# Patient Record
Sex: FEMALE | Race: WHITE | Employment: OTHER | URBAN - METROPOLITAN AREA
[De-identification: names, ages, dates, MRNs, and addresses within clinical notes are randomized per-mention and may not be internally consistent; named-entity substitution may affect disease eponyms.]

---

## 2017-10-04 ENCOUNTER — HOSPITAL ENCOUNTER (OUTPATIENT)
Dept: MAMMOGRAPHY | Age: 82
Discharge: HOME | End: 2017-10-04
Attending: INTERNAL MEDICINE
Payer: MEDICARE

## 2017-10-04 DIAGNOSIS — Z12.31 VISIT FOR SCREENING MAMMOGRAM: ICD-10-CM

## 2017-10-04 PROCEDURE — 77063 BREAST TOMOSYNTHESIS BI: CPT

## 2019-01-26 ENCOUNTER — HOSPITAL ENCOUNTER (OUTPATIENT)
Dept: MAMMOGRAPHY | Age: 84
Discharge: HOME | End: 2019-01-26
Attending: INTERNAL MEDICINE
Payer: MEDICARE

## 2019-01-26 DIAGNOSIS — Z12.31 VISIT FOR SCREENING MAMMOGRAM: ICD-10-CM

## 2019-01-26 PROCEDURE — 77063 BREAST TOMOSYNTHESIS BI: CPT

## 2019-04-23 ENCOUNTER — HOSPITAL ENCOUNTER (OUTPATIENT)
Dept: GENERAL RADIOLOGY | Age: 84
Discharge: HOME | End: 2019-04-23
Attending: INTERNAL MEDICINE
Payer: MEDICARE

## 2019-04-23 DIAGNOSIS — T17.308A CHOKING: ICD-10-CM

## 2019-04-23 PROCEDURE — 74011000250 HC RX REV CODE- 250: Performed by: INTERNAL MEDICINE

## 2019-04-23 PROCEDURE — 74220 X-RAY XM ESOPHAGUS 1CNTRST: CPT

## 2019-04-23 PROCEDURE — 74011000255 HC RX REV CODE- 255: Performed by: INTERNAL MEDICINE

## 2019-04-23 RX ADMIN — ANTACID/ANTIFLATULENT 4 G: 380; 550; 10; 10 GRANULE, EFFERVESCENT ORAL at 10:32

## 2019-04-23 RX ADMIN — BARIUM SULFATE 135 ML: 980 POWDER, FOR SUSPENSION ORAL at 10:32

## 2019-04-23 RX ADMIN — BARIUM SULFATE 355 ML: 0.6 SUSPENSION ORAL at 10:32

## 2019-07-18 ENCOUNTER — HOSPITAL ENCOUNTER (OUTPATIENT)
Dept: PHYSICAL THERAPY | Age: 84
Discharge: HOME | End: 2019-07-18
Payer: MEDICARE

## 2019-07-18 PROCEDURE — 92610 EVALUATE SWALLOWING FUNCTION: CPT | Performed by: SPEECH-LANGUAGE PATHOLOGIST

## 2019-07-18 NOTE — THERAPY EVALUATION
Terresa Castleman  : 1933  [place]  Secondary: [place] [employer] [address] at [facility]  [address] [facility], [address] [place] [address],[address], [facility] [address].  [phone]   [phone]         OUTPATIENT SPEECH LANGUAGE PATHOLOGY: Initial Assessment  ICD-10: Treatment Diagnosis: Dysphagia, Pharyngesophageal R13.14  REFERRING PHYSICIAN: Vasile Pineda MD Orders: Evaluate and Treat  PAST MEDICAL HISTORY:   Ms. [place] Mejía is a 80 y.o. female who  has no past medical history on file. She also  has a past surgical history that includes hx breast biopsy. MEDICAL/REFERRING DIAGNOSIS: Dyskinesia of esophagus [K22.4]  DATE OF ONSET: about a year ago  PRIOR LEVEL OF FUNCTION: Independent   PRECAUTIONS/ALLERGIES: Patient has no known allergies. ASSESSMENT:  Patient is an 80year old female who was referred for Dysphagia evaluation due to difficulty swallowing. She states that about a year ago she was in [employer] and her  gave her a throat lozenge and it \"slipped\" down her throat and closed her airway. Thereafter, she's had two episodes of coughing on liquids and tea. On these episodes, she can't breath and his gasping for air. No history of GERD however sometimes she will take a TUMS because she does have burning in her throat, but she states that this is very infrequent. Denies weight loss or pulmonary issues. Based on the objective data described below, the patient presents with minimal clinical s/sx of Dysphagia. Oral motor exam was WNL's. She was given trials of thin liquids via cup, puree, mixed and solids. No overt clinical s/sx of aspiration observed with any trials. Double swallows observed with all trials. She would benefit from MBSS to further assess her swallow function based off her history of choking episodes. MBSS has been requested and is scheduled for 19.  Will await results and recommendations from MBSS if further treatment is indicated. Patient in agreement with this plan. Patient will benefit from skilled intervention to address the below impairments. ?????? ? ? This section established at most recent assessment??????????  PROBLEM LIST (Impairments causing functional limitations):  1. Dysphagia  GOALS: (Goals have been discussed and agreed upon with patient.)  SHORT-TERM FUNCTIONAL GOALS: Time Frame: 90 days  Complete MBSS with 100% participation. Complete laryngeal exercises if deemed appropriate by MBSS at Mod I 90% accuracy. DISCHARGE GOALS: Time Frame: 3 months  1. Client will maintain adequate hydration/nutrition with optimum safety and efficiency of  swallowing function on P.O. intake without overt signs and symptoms of aspiration for the  highest appropriate diet level  2. Client will utilize compensatory strategies with optimum safety and efficiency of  swallowing function on P.O. intake without overt signs and symptoms of aspiration for the  highest appropriate diet level. REHABILITATION POTENTIAL FOR STATED GOALS: GoodPLAN OF CARE:  Patient will benefit from skilled intervention to address the following impairments. RECOMMENDATIONS AND PLANNED INTERVENTIONS (Benefits and precautions of therapy have been discussed with the patient.):  · continue prescribed diet  · Liquids:  regular thin  MEDICATIONS:  · With liquid  COMPENSATORY STRATEGIES/MODIFICATIONS INCLUDING:  · None  OTHER RECOMMENDATIONS (including follow up treatment recommendations):   · Laryngeal exercises  · Patient education  · if deemed appropriate by MBSS  RECOMMENDED DIET MODIFICATIONS DISCUSSED WITH:  · Patient  TREATMENT PLAN EFFECTIVE DATES: 7/18/2019 TO 8/18/2019 (30 days). FREQUENCY/DURATION: Continue to follow patient 1 time a week for 30 days to address above goals. Regarding Cristhian Tran's therapy, I certify that the treatment plan above will be carried out by a therapist or under their direction.   Thank you for this referral,  Elizabeth Heaton, C[place]lle CCC-SLP                  Referring Physician Signature: Priscilla Jean, *    Date      SUBJECTIVE:  Alert  Present Symptoms: Dysphagia   Pain Intensity 1: 0  Current Medications:   No current outpatient medications on file prior to encounter. No current facility-administered medications on file prior to encounter. Date Last Reviewed: 7/15/19 Rosuvastatin 20mg, Amdolodipine 5mg, Levothyroxine 75 mg, Biotin 5000 twice a day, Centrumslevec, Prisser Vision   Current Dietary Status:  Regular       History of reflux:  NO    Reflux medication:N/A  Social History/Home Situation:       Work/Activity History: Retired     OBJECTIVE:  Objective Measure: Tool Used: National Outcomes Measurement System: Functional Communication Measures: SWALLOWING  Score:  Initial: 5 Most Recent: X (Date: -- )   Interpretation of Tool: This measure describes the change in functional communication status subsequent to speech-language pathology treatment of patients with dysphagia.  o Level 1:  Individual is not able to swallow anything safely by mouth. All nutrition and hydration is received through non-oral means (e.g., nasogastric tube, PEG). o Level 2: Individual is not able to swallow safely by mouth for nutrition and hydration, but may take some consistency with consistent maximal cues in therapy only. Alternative method of feeding required. o Level 3:  Alternative method of feeding required as individual takes less than 50% of nutrition and hydration by mouth, and/or swallowing is safe with consistent use of moderate cues to use compensatory strategies and/or requires maximum diet restriction. o Level 4:  Swallowing is safe, but usually requires moderate cues to use compensatory strategies, and/or the individual has moderate diet restrictions and/or still requires tube feeding and/or oral supplements.   o Level 5:  Swallowing is safe with minimal diet restriction and/or occasionally requires minimal cueing to use compensatory strategies. The individual may occasionally self-cue. All nutrition and hydration needs are met by mouth at mealtime. o Level 6:  Swallowing is safe, and the individual eats and drinks independently and may rarely require minimal cueing. The individual usually self-cues when difficulty occurs. May need to avoid specific food items (e.g., popcorn and nuts), or require additional time (due to dysphagia). o Level 7: The individuals ability to eat independently is not limited by swallow function. Swallowing would be safe and efficient for all consistencies. Compensatory strategies are effectively used when needed. Score Level 7 Level 6 Level 5 Level 4 Level 3 Level 2 Level 1   Modifier CH CI CJ CK CL CM CN       Respiratory Status:      Room Air  CXR Results:N/A  MRI/CT Results:N/A  Oral Motor Structure/Speech:  Oral-Motor Structure/Motor Speech  Labial: No impairment  Dentition: Natural  Oral Hygiene: adequate  Lingual: No impairment  Velum: No impairment  Mandible: No impairment    Cognitive and Communication Status:  Neurologic State: Alert  Orientation Level: Oriented X4  Cognition: Appropriate for age attention/concentration; Follows commands  Perception: Appears intact  Perseveration: No perseveration noted  Safety/Judgement: Awareness of environment    BEDSIDE SWALLOW EVALUATION  Oral Assessment:  Oral Assessment  Labial: No impairment  Dentition: Natural  Oral Hygiene: adequate  Lingual: No impairment  Velum: No impairment  Mandible: No impairment  Gag Reflex: Present  P.O. Trials:  Patient Position: upright in chair    The patient was given teaspoon to tablespoon  amounts of the following:   Consistency Presented: Puree; Solid; Thin liquid;Mixed consistency  How Presented: Self-fed/presented;SLP-fed/presented;Straw;Successive swallows    ORAL PHASE:  Bolus Acceptance: No impairment  Bolus Formation/Control: No impairment  Propulsion: No impairment     Oral Residue: None    PHARYNGEAL PHASE:  Initiation of Swallow: No impairment  Laryngeal Elevation: Functional  Aspiration Signs/Symptoms: None  Vocal Quality: No impairment     Effective Modifications: None     Pharyngeal Phase Characteristics: Double swallow    OTHER OBSERVATIONS:  Rate/bite size: WNL   Endurance: WNL   Coments:      TREATMENT:    (In addition to Assessment/Re-Assessment sessions the following treatments were rendered)  Assessment only; No treatment(s) provided today        LARYNGEAL / PHARYNGEAL EXERCISES:                                                                                                                                     __________________________________________________________________________________________________  Treatment Assessment:  Evaluation completed. Progression/Medical Necessity:   · to rule out aspiration with liquids  Compliance with Program/Exercises: Will assess as treatment progresses. Reason for Continuation of Services/Other Comments:  · Patient continues to require skilled intervention due to c/o swallowing difficulty. Recommendations/Intent for next treatment session: \"Treatment next visit will focus on MBSS\". Total Treatment Duration:  Time In: 1100  Time Out: 199 [facility], Jabari Mcdowell

## 2019-08-06 ENCOUNTER — HOSPITAL ENCOUNTER (OUTPATIENT)
Dept: GENERAL RADIOLOGY | Age: 84
Discharge: HOME | End: 2019-08-06
Payer: MEDICARE

## 2019-08-06 DIAGNOSIS — R13.14 DYSPHAGIA, PHARYNGOESOPHAGEAL PHASE: ICD-10-CM

## 2019-08-06 DIAGNOSIS — R13.10 DYSPHAGIA: ICD-10-CM

## 2019-08-06 PROCEDURE — 74011000255 HC RX REV CODE- 255: Performed by: INTERNAL MEDICINE

## 2019-08-06 PROCEDURE — 92611 MOTION FLUOROSCOPY/SWALLOW: CPT

## 2019-08-06 PROCEDURE — 74230 X-RAY XM SWLNG FUNCJ C+: CPT

## 2019-08-06 RX ADMIN — BARIUM SULFATE 15 ML: 400 PASTE ORAL at 11:00

## 2019-08-06 RX ADMIN — BARIUM SULFATE 30 ML: 980 POWDER, FOR SUSPENSION ORAL at 10:59

## 2019-08-06 NOTE — PROGRESS NOTES
Carletha Osler  : 1933  [place]  Secondary: [place] [employer] [address] [facility] 68, [address] [facility] [place], [facility] [address] [facility]  [phone]   [phone]       OUTPATIENT SPEECH LANGUAGE PATHOLOGY: MODIFIED BARIUM SWALLOW    ICD-10: Treatment Diagnosis: Pharyngoesophageal dysphagia (R13.14)  DATE: 2019  REFERRING PHYSICIAN: Dajuan Sadler, *  MD Orders: Modifed Barium Swallow  PAST MEDICAL HISTORY:   Ms. Daryle Burner is a 80 y.o. female who  has no past medical history on file. She also  has a past surgical history that includes hx breast biopsy. RADIOLOGIST:  Dr. Ulises Schwarz  MEDICAL/REFERRING DIAGNOSIS: Dysphagia, pharyngoesophageal phase [R13.14]    PRECAUTIONS/ALLERGIES: Patient has no allergy information on record. ASSESSMENT/PLAN OF CARE:  Based on the objective data described below, the patient presents with oral and pharyngeal phase of swallow within functional limits. Swallows of all textures timely with no laryngeal penetration or aspiration observed and no pharyngeal residue after swallow. Can not rule out esophageal component or possible esophageal spasm. RECOMMENDATIONS AND PLANNED INTERVENTIONS (Benefits and precautions of therapy have been discussed with the patient.):  · continue prescribed diet  MEDICATIONS:  · With liquids as tolerated; use applesauce as needed for larger pills  COMPENSATORY STRATEGIES/MODIFICATIONS INCLUDING:  · Upright for all PO  · Small bites and sips  · Remain upright for 20-30 min after any PO  · Slow rate of PO intake    Thank you for this referral,  Jomar Cotton MA, CCC-SLP  SUBJECTIVE:    Present Symptoms: Patient reports choking on throat lozenge at [employer] requiring Heimlich maneuver. Since that episode, she has had 2 instances of choking on liquids, but no difficulties within the last 3 months.       Current Dietary Status:  Regular textures, thin liquids  History of reflux:  Patient reports occasional reflux, but no medication    OBJECTIVE:  Objective Measure: Tool Used: National Outcomes Measurement System: Functional Communication Measures: SWALLOWING  Score:  Initial: 7 Most Recent: X (Date: -- )   Interpretation of Tool: This measure describes the change in functional communication status subsequent to speech-language pathology treatment of patients with dysphagia.  o Level 1:  Individual is not able to swallow anything safely by mouth. All nutrition and hydration is received through non-oral means (e.g., nasogastric tube, PEG). o Level 2: Individual is not able to swallow safely by mouth for nutrition and hydration, but may take some consistency with consistent maximal cues in therapy only. Alternative method of feeding required. o Level 3:  Alternative method of feeding required as individual takes less than 50% of nutrition and hydration by mouth, and/or swallowing is safe with consistent use of moderate cues to use compensatory strategies and/or requires maximum diet restriction. o Level 4:  Swallowing is safe, but usually requires moderate cues to use compensatory strategies, and/or the individual has moderate diet restrictions and/or still requires tube feeding and/or oral supplements. o Level 5:  Swallowing is safe with minimal diet restriction and/or occasionally requires minimal cueing to use compensatory strategies. The individual may occasionally self-cue. All nutrition and hydration needs are met by mouth at mealtime. o Level 6:  Swallowing is safe, and the individual eats and drinks independently and may rarely require minimal cueing. The individual usually self-cues when difficulty occurs. May need to avoid specific food items (e.g., popcorn and nuts), or require additional time (due to dysphagia). o Level 7: The individuals ability to eat independently is not limited by swallow function. Swallowing would be safe and efficient for all consistencies. Compensatory strategies are effectively used when needed. Cognitive/Communication Status:  Mental Status  Neurologic State: Alert  Orientation Level: Appropriate for age  Cognition: Follows commands  Perception: Appears intact  Perseveration: No perseveration noted  Safety/Judgement: Insight into deficits    Oral Assessment:  Oral Assessment  Labial: No impairment  Dentition: Natural  Lingual: No impairment  Velum: No impairment    Vocal Quality: adequate    Patient Viewed:    Film Views: Lateral, Fluoro    Oral Prepatory:  The patient was given the following: Consistency Presented: Thin liquid, Solid, Pudding, Mixed consistency  How Presented: Self-fed/presented, Cup/sip, Cup/gulp, Spoon, Straw, Successive swallows    Oral Phase:  Bolus Acceptance: No impairment  Bolus Formation/Control: No impairment  Propulsion: No impairment     Oral Residue: None  Initiation of Swallow: No impairment  Oral Phase Severity: No impairment    Pharyngeal Phase:  Timing: No impairment  Decreased Tongue Base Retraction?: No  Laryngeal Elevation: WFL (within functional limits)  Penetration: None  Aspiration/Timing: No evidence of aspiration  Aspiration/Penetration Score: 1 (No penetration or aspiration-Contrast does not enter the airway)     Pharyngeal Dysfunction: None  Pharyngeal Phase Severity: N/A  Pharyngeal-Esophageal Segment: Suspected esophageal dysphagia    Assessment/Reassessment only, no treatment provided today    Recommendations for treatment: No further skilled speech/swallow intervention currently indicated.   Total Treatment Duration:  Time In: 1045   Time Out: 211 Izzy Rosen MA, CCC-SLP

## 2019-10-07 ENCOUNTER — HOSPITAL ENCOUNTER (OUTPATIENT)
Dept: PHYSICAL THERAPY | Age: 84
Discharge: HOME | End: 2019-10-07
Payer: MEDICARE

## 2019-10-07 PROCEDURE — 97161 PT EVAL LOW COMPLEX 20 MIN: CPT

## 2019-10-07 NOTE — THERAPY EVALUATION
Kathline Phoenix  : 1933  [place]  Secondary: [employer] [address] [facility] CECY MCNEILL  1101 [facility], [address] [place] [address],[address], [place] [address].  [phone]   [phone]       OUTPATIENT PHYSICAL [place] Assessment 10/7/2019     ICD-10: Treatment Diagnosis:Pain in right shoulder (M25.511), Other specific arthropathies, not elsewhere classified, right shoulder (M12.811)      Precautions/Allergies: allergic to Pcn and sulfa drugs  MD Orders: all active and passive ROM okay, resistance in all arcs okay, HEP, pulleys  3x/wk for 4 weeks MEDICAL/REFERRING DIAGNOSIS:  Other specific arthropathies, not elsewhere classified, right shoulder [M12.811]    DATE OF ONSET: a couple of months ago  REFERRING PHYSICIAN: Tayler Duffy MD  RETURN PHYSICIAN APPOINTMENT: about 4 weeks - after PT     INITIAL ASSESSMENT:  Ms. Tommie Carlson is an 80year old R hand dominant female with complaints of R shoulder pain. She presents with pain during active movement of shoulder, decreased ROM and strength in R shoulder and decreased functional use of R UE. She could benefit from PT to address deficits and work toward goals. PROBLEM LIST (Impacting functional limitations):  1. Decreased Strength  2. Decreased ADL/Functional Activities  3. Increased Pain  4. Decreased Flexibility/Joint Mobility INTERVENTIONS PLANNED: (Treatment may consist of any combination of the following)  1. Home Exercise Program (HEP)  2. Manual Therapy  3. Therapeutic Exercise/Strengthening  4. modals as needed   TREATMENT PLAN:  Effective Dates: 10/7/2019 TO 2020 (90 days). Frequency/Duration: 2-3 times a week for 90 Day(s) (4 weeks initially, and then depending on subsequent orders from MD). GOALS: (Goals have been discussed and agreed upon with patient.)  Patient's stated goals are to avoid shoulder surgery, and to regain as much use of R UE as possible.    Short-Term Functional Goals: Time Frame: 6 weeks  1. Patient to be independent with HEP  2. Patient to improve PROM R shoulder flex to 90 degrees for improved motion    3. Patient to rate pain with movement of R shoulder to <= 5/10  Discharge Goals: Time Frame: 90 days  1. Patient to report no more than minimal pain in R UE with all functional use  2. Patient to increase AROM R shoulder flex to 120 for improved functional use  3. Patient to improve DASH to 21 to demo improved use of R UE.     OUTCOME MEASURE:   Tool Used: Disabilities of the Arm, Shoulder and Hand (DASH) Questionnaire - Quick Version  Score:  Initial: 29/55  Most Recent: X/55 (Date: -- )   Interpretation of Score: The DASH is designed to measure the activities of daily living in person's with upper extremity dysfunction or pain. Each section is scored on a 1-5 scale, 5 representing the greatest disability. The scores of each section are added together for a total score of 55. MEDICAL NECESSITY:   · Patient demonstrates fair rehab potential due to higher previous functional level. REASON FOR SERVICES/OTHER COMMENTS:  · Patient continues to require skilled intervention due to need to regain functional use of R UE. Justin Rein Total Duration:  42 minutes  PT Patient Time In/Time Out  Time In: 1415  Time Out: 1457    Rehabilitation Potential For Stated Goals: 200 [facility]'s therapy, I certify that the treatment plan above will be carried out by a therapist or under their direction. Thank you for this referral,  Tutu Quinteros, PT     Referring Physician Signature: Jani Kidd MD _______________________________ Date _____________     PAIN/SUBJECTIVE:   Initial: Pain Intensity 1: 7(with movement, none at rest)   Post Session:  Pain not rated at end of session   HISTORY:   History of Injury/Illness (Reason for Referral):  Patient reports that R arm started hurting around August. She went to PCP and was diagnosed with bursitis.  Shoulder was injected and felt good for about 10 days. A second injection didn't help so she was sent for MRI which showed a rotator cuff tear, but she wants to avoid surgery if possible. Past Medical History/Comorbidities: HTN, high cholesterol, hysterectomy  Social History/Living Environment:     lives with  in 2 story home. Prior Level of Function/Work/Activity:  Retired teacher  Dominant Side:         RIGHT  Personal Factors:          Age:  80 y.o. Ambulatory/Rehab Services H2 Model Falls Risk Assessment   Risk Factors:       No Risk Factors Identified Ability to Rise from Chair:       (0)  Ability to rise in a single movement   Falls Prevention Plan:       No modifications necessary   Total: (5 or greater = High Risk): 0   ©2010 [facility] [email or web address]. All Rights Reserved. [facility] Patent #[id number]. Federal Law prohibits the replication, distribution or use without written permission from [facility] [employer]   Current Medications: Rosuvastatin, Amlodipine, Levothyroxine, Biotin, Centrum Silver, Presser Vision   Date Last Reviewed:  10/7/19   Number of Personal Factors/Comorbidities that affect the Plan of Care: 1-2: MODERATE COMPLEXITY   EXAMINATION:     Observation/Orthostatic Postural Assessment: patient with very little muscle mass in shoulders. No sling. Palpation: tender over anterior R shoulder and in area of R supraspinatous on posterior shoulder. ROM:       LUE AROM  L Shoulder Flexion: 155  L Shoulder Extension: 65  L Shoulder ABduction: 145  L Shoulder Internal Rotation: (T8 on back)  L Shoulder External Rotation: 75          RUE AROM  R Shoulder Flexion: 45  R Shoulder Extension: 65  R Shoulder ABduction: 60  R Shoulder Internal Rotation: (to sacrum)  R Shoulder External Rotation: 45  RUE PROM  R Shoulder Flexion: 73  R Shoulder ABduction: 73  R Shoulder Internal Rotation: 60(to abdomen)  R Shoulder External Rotation: 70                   Strength: R shoulder not tested secondary to pain.   L shoulder flex 4+/5, extn 5/5, abduct 4+/5, IR 4/5 and ER 4-/5  Neurological Screen: light touch intact in B UEs. Functional Mobility: independent, but with decreased use of R UE         Body Structures Involved:  1. Joints  2. Muscles Body Functions Affected:  1. Neuromusculoskeletal  2. Movement Related Activities and Participation Affected:  1. General Tasks and Demands  2. Self Care  3. Community, Social and [place]   Number of elements (examined above) that affect the Plan of Care: 1-2: LOW COMPLEXITY   CLINICAL PRESENTATION:   Presentation: Stable and uncomplicated: LOW COMPLEXITY   CLINICAL DECISION MAKING:   Use of outcome tool(s) and clinical judgement create a POC that gives a: Questionable prediction of patient's progress: MODERATE COMPLEXITY     Showed patient how to do wand exercises for R shoulder flex and abduct, and isometric IR and ER using other hand as resistance.

## 2019-10-08 ENCOUNTER — HOSPITAL ENCOUNTER (OUTPATIENT)
Dept: PHYSICAL THERAPY | Age: 84
Discharge: HOME | End: 2019-10-08
Payer: MEDICARE

## 2019-10-08 PROCEDURE — 97110 THERAPEUTIC EXERCISES: CPT

## 2019-10-08 PROCEDURE — 97140 MANUAL THERAPY 1/> REGIONS: CPT

## 2019-10-08 NOTE — PROGRESS NOTES
Marvin Ho  : 1933  [place] / Product Type: Medicare /  [address] [place]  at [facility] CECY MCNEILL  1101 [facility], [address] [place] [address],[address], [facility] [address].  [phone]   [phone]       OUTPATIENT PHYSICAL THERAPY: Daily Treatment Note 10/8/2019  Visit Count: 2  ICD-10: Treatment Diagnosis:Pain in right shoulder (M25.511), Other specific arthropathies, not elsewhere classified, right shoulder (M12.811)      Precautions/Allergies: allergic to Pcn and sulfa drugs  MD Orders: all active and passive ROM okay, resistance in all arcs okay, HEP, pulleys  3x/wk for 4 weeks MEDICAL/REFERRING DIAGNOSIS:  Other specific arthropathies, not elsewhere classified, right shoulder [M12.811]    DATE OF ONSET: a couple of months ago  REFERRING PHYSICIAN: Awilda Gorman MD  RETURN PHYSICIAN APPOINTMENT: about 4 weeks - after PT            Pre-treatment Symptoms/Complaints:  Patient reports her shoulder continues to be sore. Pain: Initial: Pain Intensity 1: 3(\"2 or 3\")   Post Session:  Pain not rated at end of session   Medications Last Reviewed:  10/7/19    Updated Objective Findings:   None Today     TREATMENT:     Manual Therapy ( 30 minutes) - for motion - grade 2 to 4- physio mobs R shoulder flex, abduct, IR and ER. Grade 2 to 4- inferior and posterior shoulder glides. Therapeutic Exercise: (10 Minutes):  Exercises per grid below to improve mobility and strength. Required moderate visual and verbal cues to ensure correct performance. Progressed complexity of movement as indicated. Date:  10/8/19 Date:   Date:     Activity/Exercise Parameters Parameters Parameters   5 way shoulder isometrics Manual 2x10 ea     Wand flex - reviewed     Pulleys for flex 1x10                                 HEP: continue current HEP with addition of pulleys. Pulleys issued for HEP use.    [employer] Portal    Treatment/Session Summary:    · Response to Treatment: patient very guarded during manual therapy and often squirmed with pain, especially with PT lowering her arm after elevation. .  · Communication/Consultation:  None today  · Equipment provided today:  None today  · Recommendations/Intent for next treatment session: Next visit will focus on ROM and strength. Treatment Plan of Care Effective Dates:  10/7/2019 TO 1/5/2020 (90 days). Frequency/Duration: 2-3 times a week for 90 Day(s) (4 weeks initially, and then depending on subsequent orders from MD).       Total Treatment Billable Duration:  40 minutes  PT Patient Time In/Time Out  Time In: 1118  Time Out: [address]

## 2019-10-14 ENCOUNTER — HOSPITAL ENCOUNTER (OUTPATIENT)
Dept: PHYSICAL THERAPY | Age: 84
Discharge: HOME | End: 2019-10-14
Payer: MEDICARE

## 2019-10-14 PROCEDURE — 97110 THERAPEUTIC EXERCISES: CPT

## 2019-10-14 PROCEDURE — 97140 MANUAL THERAPY 1/> REGIONS: CPT

## 2019-10-14 NOTE — PROGRESS NOTES
Cora Expose  : 1933  [place] / Product Type: Medicare /  [address] [place]  at [facility] CECY MCNEILL  [address] [place] [address], [address], [place] [address].  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 Thinks she is doing very well with the pulleys  Pain: Initial: Pain Intensity 1: 0   Post Session: \"No significant increase in pain. \"   Medications Last Reviewed:  10/14/19    Updated Objective Findings:   None Today     TREATMENT:     Manual Therapy ( 25 minutes) - for motion - grade 2 to 4- physio mobs R shoulder flex, abduct, IR and ER. Grade 2 to 4- inferior and posterior shoulder glides. Therapeutic Exercise: (15 Minutes):  Exercises per grid below to improve mobility and strength. Required moderate visual and verbal cues to ensure correct performance. Progressed complexity of movement as indicated. Date:  10/8/19 Date:  10/14/19 Date:     Activity/Exercise Parameters Parameters Parameters   5 way shoulder isometrics Manual 2x10 ea Manual 2x10 ea    Wand flex - reviewed -    Pulleys for flex 1x10 1x10    IR with band  Yellow 1x10    ER with band  Yellow 1x10                    HEP: continue current HEP with addition of IR and ER with band. Yellow band issued for HEP use. [employer]    Treatment/Session Summary:    · Response to Treatment: patient still very guarded during manual therapy. Very weak in IR and extremely weak in ER with band. .  · Communication/Consultation:  None today  · Equipment provided today:  None today  · Recommendations/Intent for next treatment session: Next visit will focus on ROM and strength. Treatment Plan of Care Effective Dates:  10/7/2019 TO 1/5/2020 (90 days). Frequency/Duration: 2-3 times a week for 90 Day(s) (4 weeks initially, and then depending on subsequent orders from MD).       Total Treatment Billable Duration:  40 minutes  PT Patient Time In/Time Out  Time In: 1121  Time Out: 71 Donnie Ortiz

## 2019-10-15 ENCOUNTER — HOSPITAL ENCOUNTER (OUTPATIENT)
Dept: PHYSICAL THERAPY | Age: 84
Discharge: HOME | End: 2019-10-15
Payer: MEDICARE

## 2019-10-15 NOTE — PROGRESS NOTES
Clementina Clarke  : 1933  [place] / Product Type: Medicare /  [address] [place]  at [facility] CECY MCNEILL  1101 [facility], [address], [address] [place] [address]  [phone]   [phone]       OUTPATIENT PHYSICAL THERAPY: No Show  10/15/2019     ICD-10: Treatment Diagnosis:Pain in right shoulder (M25.511), Other specific arthropathies, not elsewhere classified, right shoulder (M12.811)      Precautions/Allergies: allergic to Pcn and sulfa drugs  MD Orders: all active and passive ROM okay, resistance in all arcs okay, HEP, pulleys  3x/wk for 4 weeks MEDICAL/REFERRING DIAGNOSIS:  Other specific arthropathies, not elsewhere classified, right shoulder [M12.811]    DATE OF ONSET: a couple of months ago  REFERRING PHYSICIAN: Ty Gonzalez MD  RETURN PHYSICIAN APPOINTMENT: about 4 weeks - after PT            Patient did not show up initially for appointment. She came 30 minutes late because she thought her appointment was at 11:15 when it was really scheduled for 10:30. She could not be accommodated in the schedule due to someone else being scheduled at 11:15.

## 2019-10-17 ENCOUNTER — HOSPITAL ENCOUNTER (OUTPATIENT)
Dept: PHYSICAL THERAPY | Age: 84
Discharge: HOME | End: 2019-10-17
Payer: MEDICARE

## 2019-10-17 PROCEDURE — 97140 MANUAL THERAPY 1/> REGIONS: CPT

## 2019-10-17 PROCEDURE — 97110 THERAPEUTIC EXERCISES: CPT

## 2019-10-17 NOTE — PROGRESS NOTES
Windy Medeiros  : 1933  [place] / Product Type: Medicare /  [address] [place]  at [facility] CECY MCNEILL  1101 [facility], [address] [place] [address],[address], [address] [facility] [place]  [phone]   [phone]       OUTPATIENT PHYSICAL THERAPY: Daily Treatment Note 10/17/2019  Visit Count: 4  ICD-10: Treatment Diagnosis:Pain in right shoulder (M25.511), Other specific arthropathies, not elsewhere classified, right shoulder (M12.811)      Precautions/Allergies: allergic to Pcn and sulfa drugs  MD Orders: all active and passive ROM okay, resistance in all arcs okay, HEP, pulleys  3x/wk for 4 weeks MEDICAL/REFERRING DIAGNOSIS:  Other specific arthropathies, not elsewhere classified, right shoulder [M12.811]    DATE OF ONSET: a couple of months ago  REFERRING PHYSICIAN: Curtistine Kehr, MD  RETURN PHYSICIAN APPOINTMENT: about 4 weeks - after PT            Pre-treatment Symptoms/Complaints:  Patient reports her shoulder feels a little sore today. Didn't do her exercises much since last PT session. .   Pain: Initial: Pain Intensity 1: 3   Post Session:  Pain not rated at end of session   Medications Last Reviewed:  10/14/19    Updated Objective Findings:   None Today     TREATMENT:     Manual Therapy ( 25 minutes) - for motion - grade 2 to 4- physio mobs R shoulder flex, abduct, IR and ER. Grade 2 to 4- inferior and posterior shoulder glides. Therapeutic Exercise: (15 Minutes):  Exercises per grid below to improve mobility and strength. Required moderate visual and verbal cues to ensure correct performance. Progressed complexity of movement as indicated.      Date:  10/8/19 Date:  10/14/19 Date:  10/17/19   Activity/Exercise Parameters Parameters Parameters   5 way shoulder isometrics Manual 2x10 ea Manual 2x10 ea Manual 1x10 ea   Wand flex - reviewed - -   Pulleys for flex 1x10 1x10 -   IR with band  Yellow 1x10 Yellow 2x10   ER with band  Yellow 1x10 Yellow 2x10   B serratus punch 2x10   Flex in supine   1x10   Side lying abduct   2x10   Side lying ER   2x10        HEP: continue current HEP    [employer] Portal    Treatment/Session Summary:    · Response to Treatment: patient still very guarded during manual therapy and she is very weak in R shoulder, especially ER. .  · Communication/Consultation:  None today  · Equipment provided today:  None today  · Recommendations/Intent for next treatment session: Next visit will focus on ROM and strength. Treatment Plan of Care Effective Dates:  10/7/2019 TO 1/5/2020 (90 days). Frequency/Duration: 2-3 times a week for 90 Day(s) (4 weeks initially, and then depending on subsequent orders from MD).       Total Treatment Billable Duration:  40 minutes  PT Patient Time In/Time Out  Time In: 5231  Time Out: [address] Sheri Bee, PT

## 2019-10-21 ENCOUNTER — HOSPITAL ENCOUNTER (OUTPATIENT)
Dept: PHYSICAL THERAPY | Age: 84
Discharge: HOME | End: 2019-10-21
Payer: MEDICARE

## 2019-10-21 PROCEDURE — 97140 MANUAL THERAPY 1/> REGIONS: CPT

## 2019-10-21 PROCEDURE — 97110 THERAPEUTIC EXERCISES: CPT

## 2019-10-21 NOTE — PROGRESS NOTES
Jordan Garcia  : 1933  [place] / Product Type: Medicare /  [address] [place]  at [facility] CECY MCNEILL  1101 [facility], [address] [place] [address],[address], [address] [facility]  [phone]   [phone]       OUTPATIENT PHYSICAL THERAPY: Daily Treatment Note 10/21/2019  Visit Count: 5  ICD-10: Treatment Diagnosis:Pain in right shoulder (M25.511), Other specific arthropathies, not elsewhere classified, right shoulder (M12.811)      Precautions/Allergies: allergic to Pcn and sulfa drugs  MD Orders: all active and passive ROM okay, resistance in all arcs okay, HEP, pulleys  3x/wk for 4 weeks MEDICAL/REFERRING DIAGNOSIS:  Other specific arthropathies, not elsewhere classified, right shoulder [M12.811]    DATE OF ONSET: a couple of months ago  REFERRING PHYSICIAN: Og Magaña MD  RETURN PHYSICIAN APPOINTMENT: about 4 weeks - after PT            Pre-treatment Symptoms/Complaints:  Patient reports her shoulder is doing okay. Was sore Saturday at the [facility] because the tables were too high. Pain: Initial: Pain Intensity 1: 1   Post Session:  No change in pain noted at end of session   Medications Last Reviewed:  10/21/19    Updated Objective Findings:   None Today     TREATMENT:     Manual Therapy ( 15 minutes) - for motion - grade 2 to 4- physio mobs R shoulder flex, abduct, IR and ER. Grade 2 to 4- inferior and posterior shoulder glides. Therapeutic Exercise: (25 Minutes):  Exercises per grid below to improve mobility and strength. Required moderate visual and verbal cues to ensure correct performance. Progressed resistance and complexity of movement as indicated.      Date:  10/8/19 Date:  10/14/19 Date:  10/17/19 Date  10/21/19   Activity/Exercise Parameters Parameters Parameters    5 way shoulder isometrics Manual 2x10 ea Manual 2x10 ea Manual 1x10 ea Manual 1x10 ea for flex, abd, extn  2x10 ea for IR, ER   Wand flex - reviewed - - -   Pulleys for flex 1x10 1x10 - -   IR with band  Yellow 1x10 Yellow 2x10 Yellow 2x10   ER with band  Yellow 1x10 Yellow 2x10 Yellow 2x10   B serratus punch   2x10 1# 2x10   Flex in supine   1x10 2x10    Side lying abduct   2x10 2x10   Side lying ER   2x10  2x10   scap retract with band    Yellow 2x10       HEP: continue current HEP    [employer] Portal    Treatment/Session Summary:    · Response to Treatment: patient still very guarded during manual therapy. Worked more on strengthening today as she remains very weak. .  · Communication/Consultation:  None today  · Equipment provided today:  None today  · Recommendations/Intent for next treatment session: Next visit will focus on ROM and strength. Treatment Plan of Care Effective Dates:  10/7/2019 TO 1/5/2020 (90 days). Frequency/Duration: 2-3 times a week for 90 Day(s) (4 weeks initially, and then depending on subsequent orders from MD).       Total Treatment Billable Duration:  40 minutes  PT Patient Time In/Time Out  Time In: 5723  Time Out: [address] Leellen Boas

## 2019-10-22 ENCOUNTER — HOSPITAL ENCOUNTER (OUTPATIENT)
Dept: PHYSICAL THERAPY | Age: 84
Discharge: HOME | End: 2019-10-22
Payer: MEDICARE

## 2019-10-22 PROCEDURE — 97110 THERAPEUTIC EXERCISES: CPT

## 2019-10-22 PROCEDURE — 97140 MANUAL THERAPY 1/> REGIONS: CPT

## 2019-10-22 NOTE — PROGRESS NOTES
Tana Becker  : 1933  [place] / Product Type: Medicare /  [address] [place]  at [facility] CECY MCNEILL  1101 [facility], [address] [place] [address],[address], [facility] [address].  [phone]   [phone]       OUTPATIENT PHYSICAL THERAPY: Daily Treatment Note 10/22/2019  Visit Count: 6  ICD-10: Treatment Diagnosis:Pain in right shoulder (M25.511), Other specific arthropathies, not elsewhere classified, right shoulder (M12.811)      Precautions/Allergies: allergic to Pcn and sulfa drugs  MD Orders: all active and passive ROM okay, resistance in all arcs okay, HEP, pulleys  3x/wk for 4 weeks MEDICAL/REFERRING DIAGNOSIS:  Other specific arthropathies, not elsewhere classified, right shoulder [M12.811]    DATE OF ONSET: a couple of months ago  REFERRING PHYSICIAN: Betsy Mooney MD  RETURN PHYSICIAN APPOINTMENT: about 4 weeks - after PT            Pre-treatment Symptoms/Complaints:  Patient reports she isn't in any pain right now. Took a Valium before coming today so she could relax better. Pain: Initial: Pain Intensity 1: 0   Post Session:  No change in pain. Medications Last Reviewed:  10/21/19    Updated Objective Findings:   None Today     TREATMENT:     Manual Therapy ( 15 minutes) - for motion - grade 2 to 4- physio mobs R shoulder flex, abduct, IR and ER. Grade 2 to 4- inferior and posterior shoulder glides. Therapeutic Exercise: (25 Minutes):  Exercises per grid below to improve mobility and strength. Required moderate visual and verbal cues to ensure correct performance. Progressed complexity of movement as indicated.      Date:  10/8/19 Date:  10/14/19 Date:  10/17/19 Date  10/21/19 Date  10/22/19   Activity/Exercise Parameters Parameters Parameters     5 way shoulder isometrics Manual 2x10 ea Manual 2x10 ea Manual 1x10 ea Manual 1x10 ea for flex, abd, extn  2x10 ea for IR, ER Manual 2x10 ea   Wand flex - reviewed - - - -   Pulleys for flex 1x10 1x10 - - - IR with band  Yellow 1x10 Yellow 2x10 Yellow 2x10 Yellow 2x10   ER with band  Yellow 1x10 Yellow 2x10 Yellow 2x10 Yellow 2x10   B serratus punch   2x10 1# 2x10 1# 2x10   Flex in supine   1x10 2x10  2x10   Side lying abduct   2x10 2x10 2x10   Side lying ER   2x10  2x10 2x10   scap retract with band    Yellow 2x10 Yellow 2x10   B Serratus punch with band     Yellow 2x10       HEP: continue current HEP    [employer] Portal    Treatment/Session Summary:    · Response to Treatment: patient did a little better relaxing during manual therapy. Worked more on strengthening again today. She has one more visit before returning to MD.   .  · Communication/Consultation:  None today  · Equipment provided today:  None today  · Recommendations/Intent for next treatment session: Next visit will focus on ROM and strength. Treatment Plan of Care Effective Dates:  10/7/2019 TO 1/5/2020 (90 days). Frequency/Duration: 2-3 times a week for 90 Day(s) (4 weeks initially, and then depending on subsequent orders from MD).       Total Treatment Billable Duration:  40 minutes  PT Patient Time In/Time Out  Time In: 1116  Time Out: Michelle De [address] Danieleda Cabot

## 2019-10-24 ENCOUNTER — HOSPITAL ENCOUNTER (OUTPATIENT)
Dept: PHYSICAL THERAPY | Age: 84
Discharge: HOME | End: 2019-10-24
Payer: MEDICARE

## 2019-10-24 PROCEDURE — 97110 THERAPEUTIC EXERCISES: CPT

## 2019-10-24 PROCEDURE — 97140 MANUAL THERAPY 1/> REGIONS: CPT

## 2019-10-24 NOTE — PROGRESS NOTES
Marvin Ho  : 1933  [place] / Product Type: Medicare /  [address] [place]  at [facility] CECY MCNEILL  1101 [facility], [address] [place] [address],[address], [facility] [address].  [phone]   [phone]       OUTPATIENT PHYSICAL THERAPY: Daily Treatment Note 10/24/2019  Visit Count: 7  ICD-10: Treatment Diagnosis:Pain in right shoulder (M25.511), Other specific arthropathies, not elsewhere classified, right shoulder (M12.811)      Precautions/Allergies: allergic to Pcn and sulfa drugs  MD Orders: all active and passive ROM okay, resistance in all arcs okay, HEP, pulleys  3x/wk for 4 weeks MEDICAL/REFERRING DIAGNOSIS:  Other specific arthropathies, not elsewhere classified, right shoulder [M12.811]    DATE OF ONSET: a couple of months ago  REFERRING PHYSICIAN: Awilda Gorman MD  RETURN PHYSICIAN APPOINTMENT: 19            Pre-treatment Symptoms/Complaints:  Patient reports she isn't in any pain right now. Feels like she is getting a little better ROM. She does not return to MD next week after all, it is the week after that. Pain: Initial: Pain Intensity 1: 0   Post Session:  No change in pain reported by patient. Medications Last Reviewed:  10/21/19    Updated Objective Findings:   None Today     TREATMENT:     Manual Therapy ( 15 minutes) - for motion - grade 2 to 4- physio mobs R shoulder flex, abduct, IR and ER. Grade 2 to 4- inferior and posterior shoulder glides. Therapeutic Exercise: (25 Minutes):  Exercises per grid below to improve mobility and strength. Required moderate visual and verbal cues to ensure correct performance. Progressed complexity of movement as indicated.      Date:  10/8/19 Date:  10/14/19 Date:  10/17/19 Date  10/21/19 Date  10/22/19 Date  10/24/19   Activity/Exercise Parameters Parameters Parameters      5 way shoulder isometrics Manual 2x10 ea Manual 2x10 ea Manual 1x10 ea Manual 1x10 ea for flex, abd, extn  2x10 ea for IR, ER Manual 2x10 ea -   Wand flex - reviewed - - - - -   Pulleys for flex 1x10 1x10 - - - -   IR with band  Yellow 1x10 Yellow 2x10 Yellow 2x10 Yellow 2x10 Yellow 2x10   ER with band  Yellow 1x10 Yellow 2x10 Yellow 2x10 Yellow 2x10 Yellow 2x10   B serratus punch   2x10 1# 2x10 1# 2x10 1# 2x15   Flex in supine   1x10 2x10  2x10 4x5   Side lying abduct   2x10 2x10 2x10 4x5   Side lying ER   2x10  2x10 2x10 2x10   scap retract with band    Yellow 2x10 Yellow 2x10 Red 2x10   B Serratus punch with band     Yellow 2x10 Yellow 2x10   B biceps curls      1# 2x10   Wall push ups      4x5   Wall slides      1x5       HEP: continue current HEP    [employer] Portal    Treatment/Session Summary:    · Response to Treatment: patient seemed to have more pain with exercises today and needed more frequent rest breaks. She is very weak. .  · Communication/Consultation:  None today  · Equipment provided today:  None today  · Recommendations/Intent for next treatment session: Next visit will focus on ROM and strength. Treatment Plan of Care Effective Dates:  10/7/2019 TO 1/5/2020 (90 days). Frequency/Duration: 2-3 times a week for 90 Day(s) (4 weeks initially, and then depending on subsequent orders from MD).       Total Treatment Billable Duration:  40 minutes  PT Patient Time In/Time Out  Time In: 4749  Time Out: ANJU Arizmendi  Franco Holm

## 2019-10-28 ENCOUNTER — HOSPITAL ENCOUNTER (OUTPATIENT)
Dept: PHYSICAL THERAPY | Age: 84
Discharge: HOME | End: 2019-10-28
Payer: MEDICARE

## 2019-10-28 PROCEDURE — 97140 MANUAL THERAPY 1/> REGIONS: CPT

## 2019-10-28 PROCEDURE — 97110 THERAPEUTIC EXERCISES: CPT

## 2019-10-28 NOTE — PROGRESS NOTES
Marvin Ho  : 1933  [place] / Product Type: Medicare /  [address] [place]  at [facility] CECY MCNEILL  1101 [facility], [address] [place] [address],[address], [facility] [address].  [phone]   [phone]       OUTPATIENT PHYSICAL THERAPY: Daily Treatment Note 10/28/2019  Visit Count: 8  ICD-10: Treatment Diagnosis:Pain in right shoulder (M25.511), Other specific arthropathies, not elsewhere classified, right shoulder (M12.811)      Precautions/Allergies: allergic to Pcn and sulfa drugs  MD Orders: all active and passive ROM okay, resistance in all arcs okay, HEP, pulleys  3x/wk for 4 weeks MEDICAL/REFERRING DIAGNOSIS:  Other specific arthropathies, not elsewhere classified, right shoulder [M12.811]    DATE OF ONSET: a couple of months ago  REFERRING PHYSICIAN: Awilda Gorman MD  RETURN PHYSICIAN APPOINTMENT: 19            Pre-treatment Symptoms/Complaints:  Patient reports she isn't in any pain right now. But it hurt quite a bit when she first woke up this morning. Pain: Initial: Pain Intensity 1: 0   Post Session:  No change in pain reported by patient. Medications Last Reviewed:  10/28/19    Updated Objective Findings:   None Today     TREATMENT:     Manual Therapy ( 15 minutes) - for motion - grade 2 to 4- physio mobs R shoulder flex, abduct, IR and ER. Grade 2 to 4- inferior and posterior shoulder glides. Therapeutic Exercise: (25 Minutes):  Exercises per grid below to improve mobility and strength. Required moderate visual and verbal cues to ensure correct performance. Reduced resistance today as patient struggled with 1# weight on serratus punch exercise. .      Date:  10/17/19 Date  10/21/19 Date  10/22/19 Date  10/24/19 Date  10/28/19   Activity/Exercise Parameters       5 way shoulder isometrics Manual 1x10 ea Manual 1x10 ea for flex, abd, extn  2x10 ea for IR, ER Manual 2x10 ea - Manual 1x10 ea   IR with band Yellow 2x10 Yellow 2x10 Yellow 2x10 Yellow 2x10 Yellow 2x10   ER with band Yellow 2x10 Yellow 2x10 Yellow 2x10 Yellow 2x10 Yellow 2x10   B serratus punch 2x10 1# 2x10 1# 2x10 1# 2x15 1# 1x10  0# 1x15   Flex in supine 1x10 2x10  2x10 4x5 4x5   Side lying abduct 2x10 2x10 2x10 4x5 4x5   Side lying ER 2x10  2x10 2x10 2x10 2x10   scap retract with band  Yellow 2x10 Yellow 2x10 Red 2x10 Red 2x10   B Serratus punch with band   Yellow 2x10 Yellow 2x10 Yellow 2x10   B biceps curls    1# 2x10 1# 2x10   Wall push ups    4x5 4x5   Wall slides    1x5 -       HEP: continue current HEP    [employer] Portal    Treatment/Session Summary:    · Response to Treatment: patient remains very weak. She does exercises as asked, but she continues to struggle with very light resistance. She has one more PT session tomorrow and then will be out of town until she returns to MD on 11/7/19. .  · Communication/Consultation:  None today  · Equipment provided today:  None today  · Recommendations/Intent for next treatment session: Next visit will focus on ROM and strength. Treatment Plan of Care Effective Dates:  10/7/2019 TO 1/5/2020 (90 days). Frequency/Duration: 2-3 times a week for 90 Day(s) (4 weeks initially, and then depending on subsequent orders from MD).       Total Treatment Billable Duration:  40 minutes  PT Patient Time In/Time Out  Time In: 1120  Time Out: [address] Laveda Cabot

## 2019-10-29 ENCOUNTER — HOSPITAL ENCOUNTER (OUTPATIENT)
Dept: PHYSICAL THERAPY | Age: 84
Discharge: HOME | End: 2019-10-29
Payer: MEDICARE

## 2019-10-29 PROCEDURE — 97110 THERAPEUTIC EXERCISES: CPT

## 2019-10-29 PROCEDURE — 97140 MANUAL THERAPY 1/> REGIONS: CPT

## 2019-10-29 NOTE — PROGRESS NOTES
Raz Hinds  : 1933  [place] / Product Type: Medicare /  [address]  at [facility] CECY MCNEILL  1101 [facility], [address] [place] [address],[address], [facility] [address].  [phone]   [phone]       OUTPATIENT PHYSICAL THERAPY: Daily Treatment Note 10/29/2019  Visit Count: 9  ICD-10: Treatment Diagnosis:Pain in right shoulder (M25.511), Other specific arthropathies, not elsewhere classified, right shoulder (M12.811)      Precautions/Allergies: allergic to Pcn and sulfa drugs  MD Orders: all active and passive ROM okay, resistance in all arcs okay, HEP, pulleys  3x/wk for 4 weeks MEDICAL/REFERRING DIAGNOSIS:  Other specific arthropathies, not elsewhere classified, right shoulder [M12.811]    DATE OF ONSET: a couple of months ago  REFERRING PHYSICIAN: Camron Marquez MD  RETURN PHYSICIAN APPOINTMENT: 19            Pre-treatment Symptoms/Complaints:  Patient reports no changes since yesterday. Pain: Initial: Pain Intensity 1: 0   Post Session:  No change in pain reported by patient. Medications Last Reviewed:  10/28/19    Updated Objective Findings:   See progress note     TREATMENT:     Manual Therapy ( 15 minutes) - for motion - grade 2 to 4- physio mobs R shoulder flex, abduct, IR and ER. Grade 2 to 4- inferior and posterior shoulder glides. Therapeutic Exercise: (25 Minutes):  Exercises per grid below to improve mobility and strength. Required moderate visual and verbal cues to ensure correct performance.        Date:  10/17/19 Date  10/21/19 Date  10/22/19 Date  10/24/19 Date  10/28/19 Date  10/29/19   Activity/Exercise Parameters        5 way shoulder isometrics Manual 1x10 ea Manual 1x10 ea for flex, abd, extn  2x10 ea for IR, ER Manual 2x10 ea - Manual 1x10 ea Manual 1x10 ea   IR with band Yellow 2x10 Yellow 2x10 Yellow 2x10 Yellow 2x10 Yellow 2x10 Yellow 2x10   ER with band Yellow 2x10 Yellow 2x10 Yellow 2x10 Yellow 2x10 Yellow 2x10 Yellow 2x10 B serratus punch 2x10 1# 2x10 1# 2x10 1# 2x15 1# 1x10  0# 1x15 1# 2x10   Flex in supine 1x10 2x10  2x10 4x5 4x5 1x10  2x5   Side lying abduct 2x10 2x10 2x10 4x5 4x5 -   Side lying ER 2x10  2x10 2x10 2x10 2x10 -   scap retract with band  Yellow 2x10 Yellow 2x10 Red 2x10 Red 2x10 Red 2x10   B Serratus punch with band   Yellow 2x10 Yellow 2x10 Yellow 2x10 Yellow 2x10   B biceps curls    1# 2x10 1# 2x10 1# 2x12   Wall push ups    4x5 4x5 -   Wall slides    1x5 - -       HEP: continue current HEP    [facility] Portal    Treatment/Session Summary:    · Response to Treatment: patient has progressed well with both RPOM and AROM of R shoulder, but does not yet have full motion. See progress note from today for full assessment. .  · Communication/Consultation:  progress note sent to ordering MD  · Equipment provided today:  None today  · Recommendations/Intent for next treatment session: Next visit will focus on ROM and strength. Treatment Plan of Care Effective Dates:  10/7/2019 TO 1/5/2020 (90 days). Frequency/Duration: 2-3 times a week for 90 Day(s) (4 weeks initially, and then depending on subsequent orders from MD).       Total Treatment Billable Duration:  40 minutes  PT Patient Time In/Time Out  Time In: 1105  Time Out: [address] [facility]

## 2019-10-29 NOTE — PROGRESS NOTES
Kaleigh Means  : 1933  [place]  Secondary: [place] [employer] [address] [facility] CECY MCNEILL  1101 [facility], [address] [place] [address],[address], [facility] [address].  [phone]   [phone]       OUTPATIENT PHYSICAL THERAPY:Progress Report 10/29/2019     ICD-10: Treatment Diagnosis:Pain in right shoulder (M25.511), Other specific arthropathies, not elsewhere classified, right shoulder (M12.811)      Precautions/Allergies: allergic to Pcn and sulfa drugs  MD Orders: all active and passive ROM okay, resistance in all arcs okay, HEP, pulleys  3x/wk for 4 weeks  Patient has attended 9 PT sessions from 10/7/19 to 10/29/19 with one missed session MEDICAL/REFERRING DIAGNOSIS:  Other specific arthropathies, not elsewhere classified, right shoulder [M12.811]    DATE OF ONSET: a couple of months ago  REFERRING PHYSICIAN: Dee Nieto MD  RETURN PHYSICIAN APPOINTMENT: about 4 weeks - after PT     ASSESSMENT:  Ms. Verona Robb is an 80year old R hand dominant female with complaints of R shoulder pain. She presented with pain during active movement of shoulder, decreased ROM and strength in R shoulder and decreased functional use of R UE. Both her AROM and PROM of R shoulder have improved. Her DASH score is slightly improved. She could benefit from continued PT to address deficits and work toward goals. PROBLEM LIST (Impacting functional limitations):  1. Decreased Strength  2. Decreased ADL/Functional Activities  3. Increased Pain  4. Decreased Flexibility/Joint Mobility INTERVENTIONS PLANNED: (Treatment may consist of any combination of the following)  1. Home Exercise Program (HEP)  2. Manual Therapy  3. Therapeutic Exercise/Strengthening  4. modals as needed   TREATMENT PLAN:  Effective Dates: 10/7/2019 TO 2020 (90 days). Frequency/Duration: 2-3 times a week for 90 Day(s) (4 weeks initially, and then depending on subsequent orders from MD).    GOALS: (Goals have been discussed and agreed upon with patient.)  Patient's stated goals are to avoid shoulder surgery, and to regain as much use of R UE as possible. Short-Term Functional Goals: Time Frame: 6 weeks  1. Patient to be independent with HEP - MET  2. Patient to improve PROM R shoulder flex to 90 degrees for improved motion  - MET  3. Patient to rate pain with movement of R shoulder to <= 5/10 - Mostly MET  Discharge Goals: Time Frame: 90 days - all in progress  1. Patient to report no more than minimal pain in R UE with all functional use  2. Patient to increase AROM R shoulder flex to 120 for improved functional use  3. Patient to improve DASH to 21 to demo improved use of R UE.     OUTCOME MEASURE:   Tool Used: Disabilities of the Arm, Shoulder and Hand (DASH) Questionnaire - Quick Version  Score:  Initial: 29/55  Most Recent: 25/55 (Date: 10/29/19 )   Interpretation of Score: The DASH is designed to measure the activities of daily living in person's with upper extremity dysfunction or pain. Each section is scored on a 1-5 scale, 5 representing the greatest disability. The scores of each section are added together for a total score of 55. MEDICAL NECESSITY:   · Patient demonstrates fair rehab potential due to higher previous functional level. REASON FOR SERVICES/OTHER COMMENTS:  · Patient continues to require skilled intervention due to need to regain functional use of R UE. [place] PAIN/SUBJECTIVE:   Initial: Pain Intensity 1: 0   Post Session:  No change   HISTORY:   History of Injury/Illness (Reason for Referral):  Patient reports that R arm started hurting around August. She went to PCP and was diagnosed with bursitis. Shoulder was injected and felt good for about 10 days. A second injection didn't help so she was sent for MRI which showed a rotator cuff tear, but she wants to avoid surgery if possible.    Past Medical History/Comorbidities: HTN, high cholesterol, hysterectomy  Social History/Living Environment:     lives with  in 2 story home. Prior Level of Function/Work/Activity:  Retired teacher  Dominant Side:         RIGHT  Personal Factors:          Age:  80 y.o. Current Medications: Rosuvastatin, Amlodipine, Levothyroxine, Biotin, Centrum Silver, Presser Vision   Date Last Reviewed:  10/28/19   EXAMINATION:     Observation/Orthostatic Postural Assessment: patient with very little muscle mass in shoulders. No sling. ROM:  On 10/29/19                RUE AROM  R Shoulder Flexion: 85  R Shoulder Extension: 65  R Shoulder ABduction: 82  R Shoulder Internal Rotation: (T12 on back)  R Shoulder External Rotation: 65  RUE PROM  R Shoulder Flexion: 140  R Shoulder ABduction: 145  R Shoulder Internal Rotation: 70(at 90 degree abduct)  R Shoulder External Rotation: 90(at 90 degree abduct)                   Strength: R shoulder not tested secondary to pain. L shoulder flex 4+/5, extn 5/5, abduct 4+/5, IR 4/5 and ER 4-/5  (at eval)  Neurological Screen: light touch intact in B UEs.  (at eval)  Functional Mobility: independent, but with decreased use of R UE

## 2019-11-11 ENCOUNTER — HOSPITAL ENCOUNTER (OUTPATIENT)
Dept: PHYSICAL THERAPY | Age: 84
Discharge: HOME | End: 2019-11-11
Payer: MEDICARE

## 2019-11-11 PROCEDURE — 97140 MANUAL THERAPY 1/> REGIONS: CPT

## 2019-11-11 PROCEDURE — 97110 THERAPEUTIC EXERCISES: CPT

## 2019-11-11 NOTE — PROGRESS NOTES
Shirley Naik  : 1933  [place] / Product Type: Medicare /  [address]  at [facility] CECY MCNEILL  1101 [facility], [address] [place] [address],[address], [place] [address].  [phone]   [phone]       OUTPATIENT PHYSICAL THERAPY: Daily Treatment Note 2019  Visit Count: 10  ICD-10: Treatment Diagnosis:Pain in right shoulder (M25.511), Other specific arthropathies, not elsewhere classified, right shoulder (M12.811)      Precautions/Allergies: allergic to Pcn and sulfa drugs  MD Orders: all active and passive ROM okay, resistance in all arcs okay, HEP, pulleys  3x/wk for 4 weeks MEDICAL/REFERRING DIAGNOSIS:  Other specific arthropathies, not elsewhere classified, right shoulder [M12.811]    DATE OF ONSET: a couple of months ago  REFERRING PHYSICIAN: Vj Borrego MD  RETURN PHYSICIAN APPOINTMENT: 19            Pre-treatment Symptoms/Complaints:  Patient reports she returned to MD he was pleased with progress. He ordered continued PT but she forgot to bring the new script but will next time. He wants her to continue to work on strength. Pain: Initial: Pain Intensity 1: 0   Post Session:  No change in pain reported by patient. Medications Last Reviewed:  19    Updated Objective Findings:   None Today     TREATMENT:     Manual Therapy ( 15 minutes) - for motion - grade 2 to 4- physio mobs R shoulder flex, abduct, IR and ER. Grade 2 to 4- inferior and posterior shoulder glides. Therapeutic Exercise: (25 Minutes):  Exercises per grid below to improve mobility and strength. Required moderate visual and verbal cues to ensure correct performance. Progressed resistance as indicated.       Date  10/22/19 Date  10/24/19 Date  10/28/19 Date  10/29/19 Date  19   Activity/Exercise        5 way shoulder isometrics Manual 2x10 ea - Manual 1x10 ea Manual 1x10 ea Manual 2x10 ea   IR with band Yellow 2x10 Yellow 2x10 Yellow 2x10 Yellow 2x10 Yellow 2x10 ER with band Yellow 2x10 Yellow 2x10 Yellow 2x10 Yellow 2x10 Yellow 2x10   B serratus punch 1# 2x10 1# 2x15 1# 1x10  0# 1x15 1# 2x10 1# 2x12   Flex in supine 2x10 4x5 4x5 1x10  2x5 1# 3x5   Side lying abduct 2x10 4x5 4x5 - 1# 2x15   Side lying ER 2x10 2x10 2x10 - 1# 2x15   scap retract with band Yellow 2x10 Red 2x10 Red 2x10 Red 2x10 Red 2x10   B Serratus punch with band Yellow 2x10 Yellow 2x10 Yellow 2x10 Yellow 2x10 Yellow 2x10   B biceps curls  1# 2x10 1# 2x10 1# 2x12 2# 2x10   Wall push ups  4x5 4x5 - -   Wall slides  1x5 - - -       HEP: continue current HEP    [employer] Portal    Treatment/Session Summary:    · Response to Treatment: patient returned to MD and he ordered continued PT. Patient is still weak in R shoulder but she did progress in resistance used today. · Communication/Consultation:  None today  · Equipment provided today:  None today  · Recommendations/Intent for next treatment session: Next visit will focus on ROM and strength. Treatment Plan of Care Effective Dates:  10/7/2019 TO 1/5/2020 (90 days).   Frequency/Duration: 2-3 times a week for 90 Day(s)      Total Treatment Billable Duration:  40 minutes  PT Patient Time In/Time Out  Time In: 1520  Time Out: 14[address] [facility] Sheri Bee PT

## 2019-11-12 ENCOUNTER — HOSPITAL ENCOUNTER (OUTPATIENT)
Dept: PHYSICAL THERAPY | Age: 84
Discharge: HOME | End: 2019-11-12
Payer: MEDICARE

## 2019-11-12 PROCEDURE — 97140 MANUAL THERAPY 1/> REGIONS: CPT

## 2019-11-12 PROCEDURE — 97110 THERAPEUTIC EXERCISES: CPT

## 2019-11-12 NOTE — PROGRESS NOTES
Jelly Goode  : 1933  [place] / Product Type: Medicare /  [address]  at [facility] CECY MCNEILL  1101 [facility], [address] [place] [address],[address], [facility] [address].  [phone]   [phone]       OUTPATIENT PHYSICAL THERAPY: Daily Treatment Note 2019  Visit Count: 11  ICD-10: Treatment Diagnosis:Pain in right shoulder (M25.511), Other specific arthropathies, not elsewhere classified, right shoulder (M12.811)      Precautions/Allergies: allergic to Pcn and sulfa drugs  MD Orders: continue current regimen   2-3x/wk for 4 weeks  (written 19) MEDICAL/REFERRING DIAGNOSIS:  Other specific arthropathies, not elsewhere classified, right shoulder [M12.811]    DATE OF ONSET: a couple of months ago  REFERRING PHYSICIAN: Zoila De Guzman MD  [address] [facility] [address]: 19            Pre-treatment Symptoms/Complaints:  Patient reports she reached back inadvertently to put her jacket on and that made her shoulder just a little sore. Pain: Initial: Pain Intensity 1: 1   Post Session:  No change in pain reported by patient. Medications Last Reviewed:  19    Updated Objective Findings:   None Today     TREATMENT:     Manual Therapy ( 25 minutes) - for motion - grade 2 to 4- physio mobs R shoulder flex, abduct, IR and ER. Grade 2 to 4- inferior and posterior shoulder glides. Therapeutic Exercise: (15 Minutes):  Exercises per grid below to improve mobility and strength. Required moderate visual and verbal cues to ensure correct performance. Progressed complexity as indicated.       Date  10/22/19 Date  10/24/19 Date  10/28/19 Date  10/29/19 Date  19 Date  19   Activity/Exercise         5 way shoulder isometrics Manual 2x10 ea - Manual 1x10 ea Manual 1x10 ea Manual 2x10 ea -   IR with band Yellow 2x10 Yellow 2x10 Yellow 2x10 Yellow 2x10 Yellow 2x10 -   ER with band Yellow 2x10 Yellow 2x10 Yellow 2x10 Yellow 2x10 Yellow 2x10 -   B serratus punch 1# 2x10 1# 2x15 1# 1x10  0# 1x15 1# 2x10 1# 2x12 -   Flex in supine 2x10 4x5 4x5 1x10  2x5 1# 3x5 -   Side lying abduct 2x10 4x5 4x5 - 1# 2x15 -   Side lying ER 2x10 2x10 2x10 - 1# 2x15 -   scap retract with band Yellow 2x10 Red 2x10 Red 2x10 Red 2x10 Red 2x10 Red 2x10   B Serratus punch with band Yellow 2x10 Yellow 2x10 Yellow 2x10 Yellow 2x10 Yellow 2x10 -   B biceps curls  1# 2x10 1# 2x10 1# 2x12 2# 2x10 -   Wall push ups  4x5 4x5 - - 2x10   Wall slides  1x5 - - - 1x10   B shldr extn with band      Red 2x10   Lat pull downs      3# 2x10       HEP: continue current HEP    [facility] Portal    Treatment/Session Summary:    · Response to Treatment: worked more on motion today and changed exercises somewhat as patient had just been to PT yesterday. She is very weak and was unable to perform IR on cable with 3# weight. · Communication/Consultation:  None today  · Equipment provided today:  None today  · Recommendations/Intent for next treatment session: Next visit will focus on ROM and strength. Treatment Plan of Care Effective Dates:  10/7/2019 TO 1/5/2020 (90 days).   Frequency/Duration: 2-3 times a week for 90 Day(s)      Total Treatment Billable Duration:  40 minutes  PT Patient Time In/Time Out  Time In: 1015  Time Out: Sarkis Devries

## 2019-11-18 ENCOUNTER — HOSPITAL ENCOUNTER (OUTPATIENT)
Dept: PHYSICAL THERAPY | Age: 84
Discharge: HOME | End: 2019-11-18
Payer: MEDICARE

## 2019-11-18 PROCEDURE — 97110 THERAPEUTIC EXERCISES: CPT

## 2019-11-18 PROCEDURE — 97140 MANUAL THERAPY 1/> REGIONS: CPT

## 2019-11-21 ENCOUNTER — HOSPITAL ENCOUNTER (OUTPATIENT)
Dept: PHYSICAL THERAPY | Age: 84
Discharge: HOME | End: 2019-11-21
Payer: MEDICARE

## 2019-11-21 PROCEDURE — 97110 THERAPEUTIC EXERCISES: CPT

## 2019-11-21 PROCEDURE — 97140 MANUAL THERAPY 1/> REGIONS: CPT

## 2019-11-21 NOTE — PROGRESS NOTES
Shirley aNik  : 1933  [place] / Product Type: Medicare /  [address]  at [facility] CECY MCNEILL  1101 [facility], [address] [place] [address],[address], [place] [address].  [phone]   [phone]       OUTPATIENT PHYSICAL THERAPY: Daily Treatment Note 2019  Visit Count: 13  ICD-10: Treatment Diagnosis:Pain in right shoulder (M25.511), Other specific arthropathies, not elsewhere classified, right shoulder (M12.811)      Precautions/Allergies: allergic to Pcn and sulfa drugs  MD Orders: continue current regimen   2-3x/wk for 4 weeks  (written 19) MEDICAL/REFERRING DIAGNOSIS:  Other specific arthropathies, not elsewhere classified, right shoulder [M12.811]    DATE OF ONSET: a couple of months ago  REFERRING PHYSICIAN: Vj Borrego MD  [address] [facility] [address]: 19            Pre-treatment Symptoms/Complaints:  Patient reports her shoulder is okay today. No changes. Pain: Initial: Pain Intensity 1: 0   Post Session:  No change in pain reported by patient. Medications Last Reviewed:  19    Updated Objective Findings:   None Today     TREATMENT:     Manual Therapy ( 25 minutes) - for motion - grade 2 to 4- physio mobs R shoulder flex, abduct, IR and ER. Grade 2 to 4- inferior and posterior shoulder glides. Therapeutic Exercise: (15 Minutes):  Exercises per grid below to improve mobility and strength. Required moderate visual and verbal cues to ensure correct performance.   no major changes to exercises today,     Date  10/28/19 Date  10/29/19 Date  19 Date  19 Date  19 Date  19   Activity/Exercise         5 way shoulder isometrics Manual 1x10 ea Manual 1x10 ea Manual 2x10 ea - Manual 2x10 ea Manual 1 x 10 ea   IR with band Yellow 2x10 Yellow 2x10 Yellow 2x10 - Yellow 1x10 -   ER with band Yellow 2x10 Yellow 2x10 Yellow 2x10 - Yellow 1x10 -   B serratus punch 1# 1x10  0# 1x15 1# 2x10 1# 2x12 - 1# 2x12 1# 2x 10   Flex in supine 4x5 1x10  2x5 1# 3x5 - 1# 2x5 1# 3x5   Side lying abduct 4x5 - 1# 2x15 - 1# 2x10 1# 2x10   Side lying ER 2x10 - 1# 2x15 - 1# 2x10 1# 2x10   scap retract with band Red 2x10 Red 2x10 Red 2x10 Red 2x10 - Red 2x10   B Serratus punch with band Yellow 2x10 Yellow 2x10 Yellow 2x10 - - -   B biceps curls 1# 2x10 1# 2x12 2# 2x10 - - -   Wall push ups 4x5 - - 2x10 - -   Wall slides - - - 1x10 - -   B shldr extn with band    Red 2x10 - -   Lat pull downs    3# 2x10 - -       HEP: continue current HEP    [employer] Portal    Treatment/Session Summary:    · Response to Treatment: patient continues to be weak in R shoulder with intermittent pain. She performed exercises more slowly today, perhaps due to more difficulty. · Communication/Consultation:  None today  · Equipment provided today:  None today  · Recommendations/Intent for next treatment session: Next visit will focus on ROM and strength. Treatment Plan of Care Effective Dates:  10/7/2019 TO 1/5/2020 (90 days).   Frequency/Duration: 2-3 times a week for 90 Day(s)      Total Treatment Billable Duration:  40 minutes  PT Patient Time In/Time Out  Time In: 0945  Time Out: R [address]agh [address] Senthil Smith, SHANE

## 2019-11-25 ENCOUNTER — HOSPITAL ENCOUNTER (OUTPATIENT)
Dept: PHYSICAL THERAPY | Age: 84
Discharge: HOME | End: 2019-11-25
Payer: MEDICARE

## 2019-11-25 PROCEDURE — 97110 THERAPEUTIC EXERCISES: CPT

## 2019-11-25 PROCEDURE — 97140 MANUAL THERAPY 1/> REGIONS: CPT

## 2019-11-25 NOTE — PROGRESS NOTES
Cora Expose  : 1933  [place] / Product Type: Medicare /  [address] [place]  at [facility] CECY MCNEILL  1101 [facility], [address] [place] [address],[address], [place] [address].  [phone]   [phone]       OUTPATIENT PHYSICAL THERAPY: Daily Treatment Note 2019  Visit Count: 14  ICD-10: Treatment Diagnosis:Pain in right shoulder (M25.511), Other specific arthropathies, not elsewhere classified, right shoulder (M12.811)      Precautions/Allergies: allergic to Pcn and sulfa drugs  MD Orders: continue current regimen   2-3x/wk for 4 weeks  (written 19) MEDICAL/REFERRING DIAGNOSIS:  Other specific arthropathies, not elsewhere classified, right shoulder [M12.811]    DATE OF ONSET: a couple of months ago  REFERRING PHYSICIAN: Kimberly Collins MD  RETURN PHYSICIAN APPOINTMENT: 19            Pre-treatment Symptoms/Complaints:  Patient reports her shoulder is sore today. She's not sure why. Reports she returns to MD on 19  Pain: Initial: Pain Intensity 1: 4   Post Session:  No change in pain reported by patient. Medications Last Reviewed:  19    Updated Objective Findings:   None Today     TREATMENT:     Manual Therapy ( 30 minutes) - for motion - grade 2 to 4- physio mobs R shoulder flex, abduct, IR and ER. Grade 2 to 4- inferior and posterior shoulder glides. Therapeutic Exercise: (10 Minutes):  Exercises per grid below to improve mobility and strength. Required moderate visual and verbal cues to ensure correct performance. Decreased active exercises today due to patient pain. Manually resisted 5 way shoulder isometric for flex, abduct, extn, IR and ER 3 x 10 each.      Date  10/28/19 Date  10/29/19 Date  19 Date  19 Date  19 Date  19   Activity/Exercise         5 way shoulder isometrics Manual 1x10 ea Manual 1x10 ea Manual 2x10 ea - Manual 2x10 ea Manual 1 x 10 ea   IR with band Yellow 2x10 Yellow 2x10 Yellow 2x10 - Yellow 1x10 -   ER with band Yellow 2x10 Yellow 2x10 Yellow 2x10 - Yellow 1x10 -   B serratus punch 1# 1x10  0# 1x15 1# 2x10 1# 2x12 - 1# 2x12 1# 2x 10   Flex in supine 4x5 1x10  2x5 1# 3x5 - 1# 2x5 1# 3x5   Side lying abduct 4x5 - 1# 2x15 - 1# 2x10 1# 2x10   Side lying ER 2x10 - 1# 2x15 - 1# 2x10 1# 2x10   scap retract with band Red 2x10 Red 2x10 Red 2x10 Red 2x10 - Red 2x10   B Serratus punch with band Yellow 2x10 Yellow 2x10 Yellow 2x10 - - -   B biceps curls 1# 2x10 1# 2x12 2# 2x10 - - -   Wall push ups 4x5 - - 2x10 - -   Wall slides - - - 1x10 - -   B shldr extn with band    Red 2x10 - -   Lat pull downs    3# 2x10 - -       HEP: continue current HEP    [employer] Portal    Treatment/Session Summary:    · Response to Treatment: patient had more pain today and had audible crepitus with one of the repetitions of resisted IR. No major increase in pain at end of session, but she remains very weak. · Communication/Consultation:  None today  · Equipment provided today:  None today  · Recommendations/Intent for next treatment session: Next visit will focus on ROM and strength. Treatment Plan of Care Effective Dates:  10/7/2019 TO 1/5/2020 (90 days).   Frequency/Duration: 2-3 times a week for 90 Day(s)      Total Treatment Billable Duration:  40 minutes  PT Patient Time In/Time Out  Time In: 1115  Time Out: 412 [facility] Luis Cornejo, PT

## 2019-11-26 ENCOUNTER — HOSPITAL ENCOUNTER (OUTPATIENT)
Dept: PHYSICAL THERAPY | Age: 84
Discharge: HOME | End: 2019-11-26
Payer: MEDICARE

## 2019-11-26 PROCEDURE — 97140 MANUAL THERAPY 1/> REGIONS: CPT

## 2019-11-26 PROCEDURE — 97110 THERAPEUTIC EXERCISES: CPT

## 2019-11-26 NOTE — PROGRESS NOTES
Gómez Christensen  : 1933  [place] / [place] / Product Type: Medicare /  [id number]  at [facility] CECY MCNEILL  [address] [facility], [address] [place] [address],[address], [place] [address].  [phone]   [phone]       OUTPATIENT PHYSICAL THERAPY: Daily Treatment Note 2019  Visit Count: 15  ICD-10: Treatment Diagnosis:Pain in right shoulder (M25.511), Other specific arthropathies, not elsewhere classified, right shoulder (M12.811)      Precautions/Allergies: allergic to Pcn and sulfa drugs  MD Orders: continue current regimen   2-3x/wk for 4 weeks  (written 19) MEDICAL/REFERRING DIAGNOSIS:  Other specific arthropathies, not elsewhere classified, right shoulder [M12.811]    DATE OF ONSET: a couple of months ago  REFERRING PHYSICIAN: Lizett Duenas MD  RETURN PHYSICIAN APPOINTMENT: 19            Pre-treatment Symptoms/Complaints:  Patient reports her shoulder is okay. No new problems. Pain: Initial: Pain Intensity 1: 1   Post Session:  No change in pain reported by patient. Medications Last Reviewed:  19    Updated Objective Findings:       ROM:                  RUE AROM  R Shoulder Flexion: 95  R Shoulder Extension: 65  R Shoulder ABduction: 75  R Shoulder Internal Rotation: (T12 on back)  R Shoulder External Rotation: 65  RUE PROM  R Shoulder Flexion: 120  R Shoulder ABduction: 125  R Shoulder Internal Rotation: 60  R Shoulder External Rotation: 85                      TREATMENT:     Manual Therapy ( 15 minutes) - for motion - grade 2 to 4- physio mobs R shoulder flex, abduct, IR and ER. Grade 2 to 4- inferior and posterior shoulder glides. Therapeutic Exercise: (25 Minutes):  Exercises per grid below to improve mobility and strength. Required moderate visual and verbal cues to ensure correct performance.            Date  10/28/19 Date  10/29/19 Date  19 Date  19 Date  19 Date  19 Date  19   Activity/Exercise          5 way shoulder isometrics Manual 1x10 ea Manual 1x10 ea Manual 2x10 ea - Manual 2x10 ea Manual 1 x 10 ea -   IR with band Yellow 2x10 Yellow 2x10 Yellow 2x10 - Yellow 1x10 - Yellow 3x10   ER with band Yellow 2x10 Yellow 2x10 Yellow 2x10 - Yellow 1x10 - yellow 3x10   B serratus punch 1# 1x10  0# 1x15 1# 2x10 1# 2x12 - 1# 2x12 1# 2x 10 -   Flex in supine 4x5 1x10  2x5 1# 3x5 - 1# 2x5 1# 3x5 -   Side lying abduct 4x5 - 1# 2x15 - 1# 2x10 1# 2x10 -   Side lying ER 2x10 - 1# 2x15 - 1# 2x10 1# 2x10 -   scap retract with band Red 2x10 Red 2x10 Red 2x10 Red 2x10 - Red 2x10 Red 3x10   B Serratus punch with band Yellow 2x10 Yellow 2x10 Yellow 2x10 - - - Yellow 2x10   B biceps curls 1# 2x10 1# 2x12 2# 2x10 - - - 2# 3x10   Wall push ups 4x5 - - 2x10 - - -   Wall slides - - - 1x10 - - -   B shldr extn with band    Red 2x10 - - -   Lat pull downs    3# 2x10 - - -   B shldr flex with symmetry       2x10   B shldr abduct with symmetry       2x10       HEP: continue current HEP    MedBridge Portal    Treatment/Session Summary:    · Response to Treatment: patient has slightly improved AROM since last measured, but decreased PROM. She remains very weak in R shoulder. · Communication/Consultation:  None today  · Equipment provided today:  None today  · Recommendations/Intent for next treatment session: Next visit will focus on ROM and strength. Treatment Plan of Care Effective Dates:  10/7/2019 TO 1/5/2020 (90 days).   Frequency/Duration: 2-3 times a week for 90 Day(s)      Total Treatment Billable Duration:  40 minutes  PT Patient Time In/Time Out  Time In: 1258  Time Out: [address] Kurt Muller, SHANE

## 2020-03-06 ENCOUNTER — HOSPITAL ENCOUNTER (OUTPATIENT)
Dept: MAMMOGRAPHY | Age: 85
Discharge: HOME | End: 2020-03-06
Attending: FAMILY MEDICINE
Payer: MEDICARE

## 2020-03-06 DIAGNOSIS — Z12.31 ENCOUNTER FOR SCREENING MAMMOGRAM FOR BREAST CANCER: ICD-10-CM

## 2020-03-06 PROCEDURE — 77063 BREAST TOMOSYNTHESIS BI: CPT

## 2020-03-09 NOTE — THERAPY DISCHARGE
Cora Santos  : 1933  [place]  Secondary: [place] [employer] Medicare [id number] [facility] at [facility] CECY MCNEILL  [address] [facility], [address] [place] [address],[address], [facility] [address]  [phone]   [phone]       OUTPATIENT PHYSICAL THERAPY:Discontinuation Summary 3/9/2020       ICD-10: Treatment Diagnosis:Pain in right shoulder (M25.511), Other specific arthropathies, not elsewhere classified, right shoulder (M12.811)      Precautions/Allergies: allergic to Pcn and sulfa drugs  MD Orders: all active and passive ROM okay, resistance in all arcs okay, HEP, pulleys  3x/wk for 4 weeks  Patient attended 15 PT sessions from 10/7/19 to 19 with one missed session MEDICAL/REFERRING DIAGNOSIS:  Other specific arthropathies, not elsewhere classified, right shoulder [M12.811]    DATE OF ONSET: a couple of months ago  REFERRING PHYSICIAN: Kimberly Collins MD  RETURN PHYSICIAN APPOINTMENT: about 4 weeks - after PT     ASSESSMENT:  Ms. Afshan Carter is an 80year old R hand dominant female with complaints of R shoulder pain. She presented with pain during active movement of shoulder, decreased ROM and strength in R shoulder and decreased functional use of R UE. Both her AROM and PROM of R shoulder improved and her DASH score was slightly improved. She continued to be weak in R shoulder. She stopped attending PT after 2019 and she will now be discontinued from PT. TREATMENT PLAN: Discontinue PT  Unable to assess final progress toward goals. GOALS: (Goals have been discussed and agreed upon with patient.)  Patient's stated goals are to avoid shoulder surgery, and to regain as much use of R UE as possible. Short-Term Functional Goals: Time Frame: 6 weeks  1. Patient to be independent with HEP - MET  2. Patient to improve PROM R shoulder flex to 90 degrees for improved motion  - MET  3.  Patient to rate pain with movement of R shoulder to <= 5/10 - Mostly MET  Discharge Goals: Time Frame: 90 days - all in progress  1. Patient to report no more than minimal pain in R UE with all functional use  2. Patient to increase AROM R shoulder flex to 120 for improved functional use  3. Patient to improve DASH to 21 to demo improved use of R UE.     OUTCOME MEASURE:   Tool Used: Disabilities of the Arm, Shoulder and Hand (DASH) Questionnaire - Quick Version  Score:  Initial: 29/55  Most Recent: 25/55 (Date: 10/29/19 )   Interpretation of Score: The DASH is designed to measure the activities of daily living in person's with upper extremity dysfunction or pain. Each section is scored on a 1-5 scale, 5 representing the greatest disability. The scores of each section are added together for a total score of 55. Data fron initial evaluation unless otherwise indicated. PAIN/SUBJECTIVE:   Initial: Pain Intensity 1: 0   Post Session:  No change   HISTORY:   History of Injury/Illness (Reason for Referral):  Patient reports that R arm started hurting around August. She went to PCP and was diagnosed with bursitis. Shoulder was injected and felt good for about 10 days. A second injection didn't help so she was sent for MRI which showed a rotator cuff tear, but she wants to avoid surgery if possible. Past Medical History/Comorbidities: HTN, high cholesterol, hysterectomy  Social History/Living Environment:     lives with  in 2 story home. Prior Level of Function/Work/Activity:  Retired teacher  Dominant Side:         RIGHT  Personal Factors:          Age:  80 y.o. Current Medications: Rosuvastatin, Amlodipine, Levothyroxine, Biotin, Centrum Silver, Presser Vision   Date Last Reviewed:  10/28/19   EXAMINATION:     Observation/Orthostatic Postural Assessment: patient with very little muscle mass in shoulders. No sling.          ROM:  On 10/29/19                RUE AROM  R Shoulder Flexion: 85  R Shoulder Extension: 65  R Shoulder ABduction: 82  R Shoulder Internal Rotation: (T12 on back)  R Shoulder External Rotation: 65  RUE PROM  R Shoulder Flexion: 140  R Shoulder ABduction: 145  R Shoulder Internal Rotation: 70(at 90 degree abduct)  R Shoulder External Rotation: 90(at 90 degree abduct)                   Strength: R shoulder not tested secondary to pain. L shoulder flex 4+/5, extn 5/5, abduct 4+/5, IR 4/5 and ER 4-/5  (at eval)  Neurological Screen: light touch intact in B UEs.  (at eval)  Functional Mobility: independent, but with decreased use of R UE

## 2020-11-04 ENCOUNTER — APPOINTMENT (RX ONLY)
Dept: URBAN - METROPOLITAN AREA CLINIC 24 | Facility: CLINIC | Age: 85
Setting detail: DERMATOLOGY
End: 2020-11-04

## 2020-11-04 DIAGNOSIS — Z85.828 PERSONAL HISTORY OF OTHER MALIGNANT NEOPLASM OF SKIN: ICD-10-CM

## 2020-11-04 DIAGNOSIS — L82.1 OTHER SEBORRHEIC KERATOSIS: ICD-10-CM

## 2020-11-04 DIAGNOSIS — L81.4 OTHER MELANIN HYPERPIGMENTATION: ICD-10-CM

## 2020-11-04 DIAGNOSIS — Z71.89 OTHER SPECIFIED COUNSELING: ICD-10-CM

## 2020-11-04 DIAGNOSIS — L57.0 ACTINIC KERATOSIS: ICD-10-CM

## 2020-11-04 DIAGNOSIS — D22 MELANOCYTIC NEVI: ICD-10-CM

## 2020-11-04 DIAGNOSIS — D18.0 HEMANGIOMA: ICD-10-CM

## 2020-11-04 PROBLEM — D22.71 MELANOCYTIC NEVI OF RIGHT LOWER LIMB, INCLUDING HIP: Status: ACTIVE | Noted: 2020-11-04

## 2020-11-04 PROBLEM — D22.72 MELANOCYTIC NEVI OF LEFT LOWER LIMB, INCLUDING HIP: Status: ACTIVE | Noted: 2020-11-04

## 2020-11-04 PROBLEM — D18.01 HEMANGIOMA OF SKIN AND SUBCUTANEOUS TISSUE: Status: ACTIVE | Noted: 2020-11-04

## 2020-11-04 PROCEDURE — 17000 DESTRUCT PREMALG LESION: CPT | Mod: 59

## 2020-11-04 PROCEDURE — 11300 SHAVE SKIN LESION 0.5 CM/<: CPT

## 2020-11-04 PROCEDURE — 99203 OFFICE O/P NEW LOW 30 MIN: CPT | Mod: 25

## 2020-11-04 PROCEDURE — ? LIQUID NITROGEN

## 2020-11-04 PROCEDURE — ? SHAVE REMOVAL

## 2020-11-04 PROCEDURE — ? COUNSELING

## 2020-11-04 ASSESSMENT — LOCATION SIMPLE DESCRIPTION DERM
LOCATION SIMPLE: RIGHT THIGH
LOCATION SIMPLE: POSTERIOR NECK
LOCATION SIMPLE: ABDOMEN
LOCATION SIMPLE: RIGHT LOWER BACK
LOCATION SIMPLE: NOSE
LOCATION SIMPLE: RIGHT UPPER BACK
LOCATION SIMPLE: RIGHT POSTERIOR UPPER ARM
LOCATION SIMPLE: CHEST
LOCATION SIMPLE: RIGHT CHEEK
LOCATION SIMPLE: LEFT THIGH
LOCATION SIMPLE: LEFT CHEEK
LOCATION SIMPLE: LEFT UPPER BACK

## 2020-11-04 ASSESSMENT — LOCATION DETAILED DESCRIPTION DERM
LOCATION DETAILED: RIGHT INFERIOR CENTRAL MALAR CHEEK
LOCATION DETAILED: LEFT SUPERIOR LATERAL MALAR CHEEK
LOCATION DETAILED: RIGHT MEDIAL TRAPEZIAL NECK
LOCATION DETAILED: RIGHT SUPERIOR UPPER BACK
LOCATION DETAILED: LEFT MEDIAL MALAR CHEEK
LOCATION DETAILED: NASAL DORSUM
LOCATION DETAILED: RIGHT PROXIMAL POSTERIOR UPPER ARM
LOCATION DETAILED: RIGHT SUPERIOR LATERAL MIDBACK
LOCATION DETAILED: RIGHT ANTERIOR PROXIMAL THIGH
LOCATION DETAILED: RIGHT SUPERIOR LATERAL MALAR CHEEK
LOCATION DETAILED: LEFT MID-UPPER BACK
LOCATION DETAILED: LEFT LATERAL ABDOMEN
LOCATION DETAILED: EPIGASTRIC SKIN
LOCATION DETAILED: RIGHT MEDIAL SUPERIOR CHEST
LOCATION DETAILED: LEFT ANTERIOR PROXIMAL THIGH

## 2020-11-04 ASSESSMENT — LOCATION ZONE DERM
LOCATION ZONE: NECK
LOCATION ZONE: TRUNK
LOCATION ZONE: ARM
LOCATION ZONE: LEG
LOCATION ZONE: NOSE
LOCATION ZONE: FACE

## 2020-11-04 NOTE — PROCEDURE: LIQUID NITROGEN
Post-Care Instructions: I reviewed with the patient in detail post-care instructions. Patient is to wear sunprotection, and avoid picking at any of the treated lesions. Pt may apply Vaseline to crusted or scabbing areas.
Consent: The patient's consent was obtained including but not limited to risks of crusting, scabbing, blistering, scarring, darker or lighter pigmentary change, recurrence, incomplete removal and infection.
Number Of Freeze-Thaw Cycles: 2 freeze-thaw cycles
Detail Level: Simple
Render Post-Care Instructions In Note?: no
Duration Of Freeze Thaw-Cycle (Seconds): 5

## 2020-11-04 NOTE — PROCEDURE: SHAVE REMOVAL
Wound Care: Vaseline
Biopsy Method: Dermablade
Medical Necessity Clause: Obstruction of vision
Notification Instructions: Patient will be notified of biopsy results. However, patient instructed to call the office if not contacted within 2 weeks.
Anesthesia Type: 1% lidocaine with epinephrine and a 1:10 solution of 8.4% sodium bicarbonate
X Size Of Lesion In Cm (Optional): 0
Hemostasis: Electrodesiccation
Add Variable For Additional Medical Justification: No
Medical Necessity Information: It is in your best interest to select a reason for this procedure from the list below. All of these items fulfill various CMS LCD requirements except the new and changing color options.
Path Notes (To The Dermatopathologist): Please check margins.
Size Of Lesion In Cm (Required): 0.4
Was A Bandage Applied: Yes
Detail Level: Detailed
Post-Care Instructions: I reviewed with the patient in detail post-care instructions. Patient is to keep the biopsy site dry overnight, and then apply bacitracin twice daily until healed. Patient may apply hydrogen peroxide soaks to remove any crusting.
Accession #: [id number]
Consent was obtained from the patient. The risks and benefits to therapy were discussed in detail. Specifically, the risks of infection, scarring, bleeding, prolonged wound healing, incomplete removal, allergy to anesthesia, nerve injury and recurrence were addressed. Prior to the procedure, the treatment site was clearly identified and confirmed by the patient. All components of Universal Protocol/PAUSE Rule completed.
Billing Type: Third-Party Bill

## 2020-11-30 ENCOUNTER — APPOINTMENT (RX ONLY)
Dept: URBAN - METROPOLITAN AREA CLINIC 24 | Facility: CLINIC | Age: 85
Setting detail: DERMATOLOGY
End: 2020-11-30

## 2020-11-30 DIAGNOSIS — D485 NEOPLASM OF UNCERTAIN BEHAVIOR OF SKIN: ICD-10-CM

## 2020-11-30 PROBLEM — D48.5 NEOPLASM OF UNCERTAIN BEHAVIOR OF SKIN: Status: ACTIVE | Noted: 2020-11-30

## 2020-11-30 PROCEDURE — ? EXCISION

## 2020-11-30 PROCEDURE — 11402 EXC TR-EXT B9+MARG 1.1-2 CM: CPT

## 2020-11-30 PROCEDURE — 12032 INTMD RPR S/A/T/EXT 2.6-7.5: CPT

## 2020-11-30 ASSESSMENT — LOCATION DETAILED DESCRIPTION DERM: LOCATION DETAILED: RIGHT ANTERIOR PROXIMAL THIGH

## 2020-11-30 ASSESSMENT — LOCATION ZONE DERM: LOCATION ZONE: LEG

## 2020-11-30 ASSESSMENT — LOCATION SIMPLE DESCRIPTION DERM: LOCATION SIMPLE: RIGHT THIGH

## 2020-11-30 NOTE — PROCEDURE: EXCISION
Excision Depth: adipose tissue
O-Z Plasty Text: The defect edges were debeveled with a #15 scalpel blade.  Given the location of the defect, shape of the defect and the proximity to free margins an O-Z plasty (double transposition flap) was deemed most appropriate.  Using a sterile surgical marker, the appropriate transposition flaps were drawn incorporating the defect and placing the expected incisions within the relaxed skin tension lines where possible.    The area thus outlined was incised deep to adipose tissue with a #15 scalpel blade.  The skin margins were undermined to an appropriate distance in all directions utilizing iris scissors.  Hemostasis was achieved with electrocautery.  The flaps were then transposed into place, one clockwise and the other counterclockwise, and anchored with interrupted buried subcutaneous sutures.
O-L Flap Text: The defect edges were debeveled with a #15 scalpel blade.  Given the location of the defect, shape of the defect and the proximity to free margins an O-L flap was deemed most appropriate.  Using a sterile surgical marker, an appropriate advancement flap was drawn incorporating the defect and placing the expected incisions within the relaxed skin tension lines where possible.    The area thus outlined was incised deep to adipose tissue with a #15 scalpel blade.  The skin margins were undermined to an appropriate distance in all directions utilizing iris scissors.
Complex Repair And Z Plasty Text: The defect edges were debeveled with a #15 scalpel blade.  The primary defect was closed partially with a complex linear closure.  Given the location of the remaining defect, shape of the defect and the proximity to free margins a Z plasty was deemed most appropriate for complete closure of the defect.  Using a sterile surgical marker, an appropriate advancement flap was drawn incorporating the defect and placing the expected incisions within the relaxed skin tension lines where possible.    The area thus outlined was incised deep to adipose tissue with a #15 scalpel blade.  The skin margins were undermined to an appropriate distance in all directions utilizing iris scissors.
Island Pedicle Flap Text: The defect edges were debeveled with a #15 scalpel blade.  Given the location of the defect, shape of the defect and the proximity to free margins an island pedicle advancement flap was deemed most appropriate.  Using a sterile surgical marker, an appropriate advancement flap was drawn incorporating the defect, outlining the appropriate donor tissue and placing the expected incisions within the relaxed skin tension lines where possible.    The area thus outlined was incised deep to adipose tissue with a #15 scalpel blade.  The skin margins were undermined to an appropriate distance in all directions around the primary defect and laterally outward around the island pedicle utilizing iris scissors.  There was minimal undermining beneath the pedicle flap.
Xenograft Text: The defect edges were debeveled with a #15 scalpel blade.  Given the location of the defect, shape of the defect and the proximity to free margins a xenograft was deemed most appropriate.  The graft was then trimmed to fit the size of the defect.  The graft was then placed in the primary defect and oriented appropriately.
Complex Repair And Rotation Flap Text: The defect edges were debeveled with a #15 scalpel blade.  The primary defect was closed partially with a complex linear closure.  Given the location of the remaining defect, shape of the defect and the proximity to free margins a rotation flap was deemed most appropriate for complete closure of the defect.  Using a sterile surgical marker, an appropriate advancement flap was drawn incorporating the defect and placing the expected incisions within the relaxed skin tension lines where possible.    The area thus outlined was incised deep to adipose tissue with a #15 scalpel blade.  The skin margins were undermined to an appropriate distance in all directions utilizing iris scissors.
Advancement Flap (Single) Text: The defect edges were debeveled with a #15 scalpel blade.  Given the location of the defect and the proximity to free margins a single advancement flap was deemed most appropriate.  Using a sterile surgical marker, an appropriate advancement flap was drawn incorporating the defect and placing the expected incisions within the relaxed skin tension lines where possible.    The area thus outlined was incised deep to adipose tissue with a #15 scalpel blade.  The skin margins were undermined to an appropriate distance in all directions utilizing iris scissors.
Undermining Type: Entire Wound
Primary Defect Width (In Cm): 0
Crescentic Complex Repair Preamble Text (Leave Blank If You Do Not Want): Extensive wide undermining was performed.
Lip Wedge Excision Repair Text: Given the location of the defect and the proximity to free margins a full thickness wedge repair was deemed most appropriate.  Using a sterile surgical marker, the appropriate repair was drawn incorporating the defect and placing the expected incisions perpendicular to the vermilion border.  The vermilion border was also meticulously outlined to ensure appropriate reapproximation during the repair.  The area thus outlined was incised through and through with a #15 scalpel blade.  The muscularis and dermis were reaproximated with deep sutures following hemostasis. Care was taken to realign the vermilion border before proceeding with the superficial closure.  Once the vermilion was realigned the superfical and mucosal closure was finished.
Additional Anesthesia Volume In Cc: 6
Size Of Lesion In Cm: 0.4
Epidermal Closure Graft Donor Site (Optional): simple interrupted
Cheek Interpolation Flap Text: A decision was made to reconstruct the defect utilizing an interpolation axial flap and a staged reconstruction.  A telfa template was made of the defect.  This telfa template was then used to outline the Cheek Interpolation flap.  The donor area for the pedicle flap was then injected with anesthesia.  The flap was excised through the skin and subcutaneous tissue down to the layer of the underlying musculature.  The interpolation flap was carefully excised within this deep plane to maintain its blood supply.  The edges of the donor site were undermined.   The donor site was closed in a primary fashion.  The pedicle was then rotated into position and sutured.  Once the tube was sutured into place, adequate blood supply was confirmed with blanching and refill.  The pedicle was then wrapped with xeroform gauze and dressed appropriately with a telfa and gauze bandage to ensure continued blood supply and protect the attached pedicle.
Estimated Blood Loss (Cc): minimal
Complex Repair And Tissue Cultured Epidermal Autograft Text: The defect edges were debeveled with a #15 scalpel blade.  The primary defect was closed partially with a complex linear closure.  Given the location of the defect, shape of the defect and the proximity to free margins an tissue cultured epidermal autograft was deemed most appropriate to repair the remaining defect.  The graft was trimmed to fit the size of the remaining defect.  The graft was then placed in the primary defect, oriented appropriately, and sutured into place.
Hatchet Flap Text: The defect edges were debeveled with a #15 scalpel blade.  Given the location of the defect, shape of the defect and the proximity to free margins a hatchet flap was deemed most appropriate.  Using a sterile surgical marker, an appropriate hatchet flap was drawn incorporating the defect and placing the expected incisions within the relaxed skin tension lines where possible.    The area thus outlined was incised deep to adipose tissue with a #15 scalpel blade.  The skin margins were undermined to an appropriate distance in all directions utilizing iris scissors.
Show Repair Anesthesia Variables: Yes
H Plasty Text: Given the location of the defect, shape of the defect and the proximity to free margins a H-plasty was deemed most appropriate for repair.  Using a sterile surgical marker, the appropriate advancement arms of the H-plasty were drawn incorporating the defect and placing the expected incisions within the relaxed skin tension lines where possible. The area thus outlined was incised deep to adipose tissue with a #15 scalpel blade. The skin margins were undermined to an appropriate distance in all directions utilizing iris scissors.  The opposing advancement arms were then advanced into place in opposite direction and anchored with interrupted buried subcutaneous sutures.
Complex Repair And Burow's Graft Text: The defect edges were debeveled with a #15 scalpel blade.  The primary defect was closed partially with a complex linear closure.  Given the location of the defect, shape of the defect, the proximity to free margins and the presence of a standing cone deformity a Burow's graft was deemed most appropriate to repair the remaining defect.  The graft was trimmed to fit the size of the remaining defect.  The graft was then placed in the primary defect, oriented appropriately, and sutured into place.
Validate That Simple Repair Length Is Not Zero (If You Leave At 0 It Will Not Render In Note): No
V-Y Flap Text: The defect edges were debeveled with a #15 scalpel blade.  Given the location of the defect, shape of the defect and the proximity to free margins a V-Y flap was deemed most appropriate.  Using a sterile surgical marker, an appropriate advancement flap was drawn incorporating the defect and placing the expected incisions within the relaxed skin tension lines where possible.    The area thus outlined was incised deep to adipose tissue with a #15 scalpel blade.  The skin margins were undermined to an appropriate distance in all directions utilizing iris scissors.
Double Island Pedicle Flap Text: The defect edges were debeveled with a #15 scalpel blade.  Given the location of the defect, shape of the defect and the proximity to free margins a double island pedicle advancement flap was deemed most appropriate.  Using a sterile surgical marker, an appropriate advancement flap was drawn incorporating the defect, outlining the appropriate donor tissue and placing the expected incisions within the relaxed skin tension lines where possible.    The area thus outlined was incised deep to adipose tissue with a #15 scalpel blade.  The skin margins were undermined to an appropriate distance in all directions around the primary defect and laterally outward around the island pedicle utilizing iris scissors.  There was minimal undermining beneath the pedicle flap.
Complex Repair And Single Advancement Flap Text: The defect edges were debeveled with a #15 scalpel blade.  The primary defect was closed partially with a complex linear closure.  Given the location of the remaining defect, shape of the defect and the proximity to free margins a single advancement flap was deemed most appropriate for complete closure of the defect.  Using a sterile surgical marker, an appropriate advancement flap was drawn incorporating the defect and placing the expected incisions within the relaxed skin tension lines where possible.    The area thus outlined was incised deep to adipose tissue with a #15 scalpel blade.  The skin margins were undermined to an appropriate distance in all directions utilizing iris scissors.
Complex Repair And V-Y Plasty Text: The defect edges were debeveled with a #15 scalpel blade.  The primary defect was closed partially with a complex linear closure.  Given the location of the remaining defect, shape of the defect and the proximity to free margins a V-Y plasty was deemed most appropriate for complete closure of the defect.  Using a sterile surgical marker, an appropriate advancement flap was drawn incorporating the defect and placing the expected incisions within the relaxed skin tension lines where possible.    The area thus outlined was incised deep to adipose tissue with a #15 scalpel blade.  The skin margins were undermined to an appropriate distance in all directions utilizing iris scissors.
Vermilion Border Text: The closure involved the vermilion border.
Chonodrocutaneous Helical Advancement Flap Text: The defect edges were debeveled with a #15 scalpel blade.  Given the location of the defect and the proximity to free margins a chondrocutaneous helical advancement flap was deemed most appropriate.  Using a sterile surgical marker, the appropriate advancement flap was drawn incorporating the defect and placing the expected incisions within the relaxed skin tension lines where possible.    The area thus outlined was incised deep to adipose tissue with a #15 scalpel blade.  The skin margins were undermined to an appropriate distance in all directions utilizing iris scissors.
Medical Necessity Information: It is in your best interest to select a reason for this procedure from the list below. All of these items fulfill various CMS LCD requirements except lesion extends to a margin.
Path Notes (To The Dermatopathologist): Please check margins.
Burow's Graft Text: The defect edges were debeveled with a #15 scalpel blade.  Given the location of the defect, shape of the defect, the proximity to free margins and the presence of a standing cone deformity a Burow's skin graft was deemed most appropriate. The standing cone was removed and this tissue was then trimmed to the shape of the primary defect. The adipose tissue was also removed until only dermis and epidermis were left.  The skin margins of the secondary defect were undermined to an appropriate distance in all directions utilizing iris scissors.  The secondary defect was closed with interrupted buried subcutaneous sutures.  The skin edges were then re-apposed with running  sutures.  The skin graft was then placed in the primary defect and oriented appropriately.
Anesthesia Volume In Cc: 9
Intermediate Repair Preamble Text (Leave Blank If You Do Not Want): Undermining was performed with blunt dissection.
Helical Rim Advancement Flap Text: The defect edges were debeveled with a #15 blade scalpel.  Given the location of the defect and the proximity to free margins (helical rim) a double helical rim advancement flap was deemed most appropriate.  Using a sterile surgical marker, the appropriate advancement flaps were drawn incorporating the defect and placing the expected incisions between the helical rim and antihelix where possible.  The area thus outlined was incised through and through with a #15 scalpel blade.  With a skin hook and iris scissors, the flaps were gently and sharply undermined and freed up.
Saucerization Excision Additional Text (Leave Blank If You Do Not Want): The margin was drawn around the clinically apparent lesion.  Incisions were then made along these lines, in a tangential fashion, to the appropriate tissue plane and the lesion was extirpated.
Melolabial Interpolation Flap Text: A decision was made to reconstruct the defect utilizing an interpolation axial flap and a staged reconstruction.  A telfa template was made of the defect.  This telfa template was then used to outline the melolabial interpolation flap.  The donor area for the pedicle flap was then injected with anesthesia.  The flap was excised through the skin and subcutaneous tissue down to the layer of the underlying musculature.  The pedicle flap was carefully excised within this deep plane to maintain its blood supply.  The edges of the donor site were undermined.   The donor site was closed in a primary fashion.  The pedicle was then rotated into position and sutured.  Once the tube was sutured into place, adequate blood supply was confirmed with blanching and refill.  The pedicle was then wrapped with xeroform gauze and dressed appropriately with a telfa and gauze bandage to ensure continued blood supply and protect the attached pedicle.
Star Wedge Flap Text: The defect edges were debeveled with a #15 scalpel blade.  Given the location of the defect, shape of the defect and the proximity to free margins a star wedge flap was deemed most appropriate.  Using a sterile surgical marker, an appropriate rotation flap was drawn incorporating the defect and placing the expected incisions within the relaxed skin tension lines where possible. The area thus outlined was incised deep to adipose tissue with a #15 scalpel blade.  The skin margins were undermined to an appropriate distance in all directions utilizing iris scissors.
Zygomaticofacial Flap Text: Given the location of the defect, shape of the defect and the proximity to free margins a zygomaticofacial flap was deemed most appropriate for repair.  Using a sterile surgical marker, the appropriate flap was drawn incorporating the defect and placing the expected incisions within the relaxed skin tension lines where possible. The area thus outlined was incised deep to adipose tissue with a #15 scalpel blade with preservation of a vascular pedicle.  The skin margins were undermined to an appropriate distance in all directions utilizing iris scissors.  The flap was then placed into the defect and anchored with interrupted buried subcutaneous sutures.
Repair Type: Intermediate
Complex Repair And Dermal Autograft Text: The defect edges were debeveled with a #15 scalpel blade.  The primary defect was closed partially with a complex linear closure.  Given the location of the defect, shape of the defect and the proximity to free margins an dermal autograft was deemed most appropriate to repair the remaining defect.  The graft was trimmed to fit the size of the remaining defect.  The graft was then placed in the primary defect, oriented appropriately, and sutured into place.
Mucosal Advancement Flap Text: Given the location of the defect, shape of the defect and the proximity to free margins a mucosal advancement flap was deemed most appropriate. Incisions were made with a 15 blade scalpel in the appropriate fashion along the cutaneous vermillion border and the mucosal lip. The remaining actinically damaged mucosal tissue was excised.  The mucosal advancement flap was then elevated to the gingival sulcus with care taken to preserve the neurovascular structures and advanced into the primary defect. Care was taken to ensure that precise realignment of the vermilion border was achieved.
Complex Repair And W Plasty Text: The defect edges were debeveled with a #15 scalpel blade.  The primary defect was closed partially with a complex linear closure.  Given the location of the remaining defect, shape of the defect and the proximity to free margins a W plasty was deemed most appropriate for complete closure of the defect.  Using a sterile surgical marker, an appropriate advancement flap was drawn incorporating the defect and placing the expected incisions within the relaxed skin tension lines where possible.    The area thus outlined was incised deep to adipose tissue with a #15 scalpel blade.  The skin margins were undermined to an appropriate distance in all directions utilizing iris scissors.
O-T Advancement Flap Text: The defect edges were debeveled with a #15 scalpel blade.  Given the location of the defect, shape of the defect and the proximity to free margins an O-T advancement flap was deemed most appropriate.  Using a sterile surgical marker, an appropriate advancement flap was drawn incorporating the defect and placing the expected incisions within the relaxed skin tension lines where possible.    The area thus outlined was incised deep to adipose tissue with a #15 scalpel blade.  The skin margins were undermined to an appropriate distance in all directions utilizing iris scissors.
Home Suture Removal Text: Patient was provided a home suture removal kit and will remove their sutures at home.  If they have any questions or difficulties they will call the office.
Where Do You Want The Question To Include Opioid Counseling Located?: Case Summary Tab
Bilobed Flap Text: The defect edges were debeveled with a #15 scalpel blade.  Given the location of the defect and the proximity to free margins a bilobe flap was deemed most appropriate.  Using a sterile surgical marker, an appropriate bilobe flap drawn around the defect.    The area thus outlined was incised deep to adipose tissue with a #15 scalpel blade.  The skin margins were undermined to an appropriate distance in all directions utilizing iris scissors.
Epidermal Autograft Text: The defect edges were debeveled with a #15 scalpel blade.  Given the location of the defect, shape of the defect and the proximity to free margins an epidermal autograft was deemed most appropriate.  Using a sterile surgical marker, the primary defect shape was transferred to the donor site. The epidermal graft was then harvested.  The skin graft was then placed in the primary defect and oriented appropriately.
Consent was obtained from the patient. The risks and benefits to therapy were discussed in detail. Specifically, the risks of infection, scarring, bleeding, prolonged wound healing, incomplete removal, allergy to anesthesia, nerve injury and recurrence were addressed. Prior to the procedure, the treatment site was clearly identified and confirmed by the patient. All components of Universal Protocol/PAUSE Rule completed.
Hemigard Retention Suture: 2-0 Nylon
Complex Repair And O-T Advancement Flap Text: The defect edges were debeveled with a #15 scalpel blade.  The primary defect was closed partially with a complex linear closure.  Given the location of the remaining defect, shape of the defect and the proximity to free margins an O-T advancement flap was deemed most appropriate for complete closure of the defect.  Using a sterile surgical marker, an appropriate advancement flap was drawn incorporating the defect and placing the expected incisions within the relaxed skin tension lines where possible.    The area thus outlined was incised deep to adipose tissue with a #15 scalpel blade.  The skin margins were undermined to an appropriate distance in all directions utilizing iris scissors.
Perilesional Excision Additional Text (Leave Blank If You Do Not Want): The margin was drawn around the clinically apparent lesion. Incisions were then made along these lines to the appropriate tissue plane and the lesion was extirpated.
Retention Suture Bite Size: 3 mm
Paramedian Forehead Flap Text: A decision was made to reconstruct the defect utilizing an interpolation axial flap and a staged reconstruction.  A telfa template was made of the defect.  This telfa template was then used to outline the paramedian forehead pedicle flap.  The donor area for the pedicle flap was then injected with anesthesia.  The flap was excised through the skin and subcutaneous tissue down to the layer of the underlying musculature.  The pedicle flap was carefully excised within this deep plane to maintain its blood supply.  The edges of the donor site were undermined.   The donor site was closed in a primary fashion.  The pedicle was then rotated into position and sutured.  Once the tube was sutured into place, adequate blood supply was confirmed with blanching and refill.  The pedicle was then wrapped with xeroform gauze and dressed appropriately with a telfa and gauze bandage to ensure continued blood supply and protect the attached pedicle.
Retention Suture Text: Retention sutures were placed to support the closure and prevent dehiscence.
Hemigard Intro: Due to skin fragility and wound tension, it was decided to use HEMIGARD adhesive retention suture devices to permit a linear closure. The skin was cleaned and dried for a 6cm distance away from the wound. Excessive hair, if present, was removed to allow for adhesion.
Melolabial Transposition Flap Text: The defect edges were debeveled with a #15 scalpel blade.  Given the location of the defect and the proximity to free margins a melolabial flap was deemed most appropriate.  Using a sterile surgical marker, an appropriate melolabial transposition flap was drawn incorporating the defect.    The area thus outlined was incised deep to adipose tissue with a #15 scalpel blade.  The skin margins were undermined to an appropriate distance in all directions utilizing iris scissors.
Billing Type: Third-Party Bill
Complex Repair And Skin Substitute Graft Text: The defect edges were debeveled with a #15 scalpel blade.  The primary defect was closed partially with a complex linear closure.  Given the location of the remaining defect, shape of the defect and the proximity to free margins a skin substitute graft was deemed most appropriate to repair the remaining defect.  The graft was trimmed to fit the size of the remaining defect.  The graft was then placed in the primary defect, oriented appropriately, and sutured into place.
Spiral Flap Text: The defect edges were debeveled with a #15 scalpel blade.  Given the location of the defect, shape of the defect and the proximity to free margins a spiral flap was deemed most appropriate.  Using a sterile surgical marker, an appropriate rotation flap was drawn incorporating the defect and placing the expected incisions within the relaxed skin tension lines where possible. The area thus outlined was incised deep to adipose tissue with a #15 scalpel blade.  The skin margins were undermined to an appropriate distance in all directions utilizing iris scissors.
Complex Repair And Ftsg Text: The defect edges were debeveled with a #15 scalpel blade.  The primary defect was closed partially with a complex linear closure.  Given the location of the defect, shape of the defect and the proximity to free margins a full thickness skin graft was deemed most appropriate to repair the remaining defect.  The graft was trimmed to fit the size of the remaining defect.  The graft was then placed in the primary defect, oriented appropriately, and sutured into place.
Double O-Z Flap Text: The defect edges were debeveled with a #15 scalpel blade.  Given the location of the defect, shape of the defect and the proximity to free margins a Double O-Z flap was deemed most appropriate.  Using a sterile surgical marker, an appropriate transposition flap was drawn incorporating the defect and placing the expected incisions within the relaxed skin tension lines where possible. The area thus outlined was incised deep to adipose tissue with a #15 scalpel blade.  The skin margins were undermined to an appropriate distance in all directions utilizing iris scissors.
Interpolation Flap Text: A decision was made to reconstruct the defect utilizing an interpolation axial flap and a staged reconstruction.  A telfa template was made of the defect.  This telfa template was then used to outline the interpolation flap.  The donor area for the pedicle flap was then injected with anesthesia.  The flap was excised through the skin and subcutaneous tissue down to the layer of the underlying musculature.  The interpolation flap was carefully excised within this deep plane to maintain its blood supply.  The edges of the donor site were undermined.   The donor site was closed in a primary fashion.  The pedicle was then rotated into position and sutured.  Once the tube was sutured into place, adequate blood supply was confirmed with blanching and refill.  The pedicle was then wrapped with xeroform gauze and dressed appropriately with a telfa and gauze bandage to ensure continued blood supply and protect the attached pedicle.
Tissue Cultured Epidermal Autograft Text: The defect edges were debeveled with a #15 scalpel blade.  Given the location of the defect, shape of the defect and the proximity to free margins a tissue cultured epidermal autograft was deemed most appropriate.  The graft was then trimmed to fit the size of the defect.  The graft was then placed in the primary defect and oriented appropriately.
Complex Repair And Melolabial Flap Text: The defect edges were debeveled with a #15 scalpel blade.  The primary defect was closed partially with a complex linear closure.  Given the location of the remaining defect, shape of the defect and the proximity to free margins a melolabial flap was deemed most appropriate for complete closure of the defect.  Using a sterile surgical marker, an appropriate advancement flap was drawn incorporating the defect and placing the expected incisions within the relaxed skin tension lines where possible.    The area thus outlined was incised deep to adipose tissue with a #15 scalpel blade.  The skin margins were undermined to an appropriate distance in all directions utilizing iris scissors.
No Repair - Repaired With Adjacent Surgical Defect Text (Leave Blank If You Do Not Want): After the excision the defect was repaired concurrently with another surgical defect which was in close approximation.
Accession #: [id number]
Hemostasis: Electrocautery
O-T Plasty Text: The defect edges were debeveled with a #15 scalpel blade.  Given the location of the defect, shape of the defect and the proximity to free margins an O-T plasty was deemed most appropriate.  Using a sterile surgical marker, an appropriate O-T plasty was drawn incorporating the defect and placing the expected incisions within the relaxed skin tension lines where possible.    The area thus outlined was incised deep to adipose tissue with a #15 scalpel blade.  The skin margins were undermined to an appropriate distance in all directions utilizing iris scissors.
Split-Thickness Skin Graft Text: The defect edges were debeveled with a #15 scalpel blade.  Given the location of the defect, shape of the defect and the proximity to free margins a split thickness skin graft was deemed most appropriate.  Using a sterile surgical marker, the primary defect shape was transferred to the donor site. The split thickness graft was then harvested.  The skin graft was then placed in the primary defect and oriented appropriately.
Size Of Margin In Cm: 0.5
Detail Level: Detailed
Bi-Rhombic Flap Text: The defect edges were debeveled with a #15 scalpel blade.  Given the location of the defect and the proximity to free margins a bi-rhombic flap was deemed most appropriate.  Using a sterile surgical marker, an appropriate rhombic flap was drawn incorporating the defect. The area thus outlined was incised deep to adipose tissue with a #15 scalpel blade.  The skin margins were undermined to an appropriate distance in all directions utilizing iris scissors.
Eliptical Excision Additional Text (Leave Blank If You Do Not Want): The margin was drawn around the clinically apparent lesion.  An elliptical shape was then drawn on the skin incorporating the lesion and margins.  Incisions were then made along these lines to the appropriate tissue plane and the lesion was extirpated.
Dorsal Nasal Flap Text: The defect edges were debeveled with a #15 scalpel blade.  Given the location of the defect and the proximity to free margins a dorsal nasal flap was deemed most appropriate.  Using a sterile surgical marker, an appropriate dorsal nasal flap was drawn around the defect.    The area thus outlined was incised deep to adipose tissue with a #15 scalpel blade.  The skin margins were undermined to an appropriate distance in all directions utilizing iris scissors.
Epidermal Closure: running
Intermediate / Complex Repair - Final Wound Length In Cm: 4.2
Muscle Hinge Flap Text: The defect edges were debeveled with a #15 scalpel blade.  Given the size, depth and location of the defect and the proximity to free margins a muscle hinge flap was deemed most appropriate.  Using a sterile surgical marker, an appropriate hinge flap was drawn incorporating the defect. The area thus outlined was incised with a #15 scalpel blade.  The skin margins were undermined to an appropriate distance in all directions utilizing iris scissors.
Suturegard Body: The suture ends were repeatedly re-tightened and re-clamped to achieve the desired tissue expansion.
Banner Transposition Flap Text: The defect edges were debeveled with a #15 scalpel blade.  Given the location of the defect and the proximity to free margins a Banner transposition flap was deemed most appropriate.  Using a sterile surgical marker, an appropriate flap drawn around the defect. The area thus outlined was incised deep to adipose tissue with a #15 scalpel blade.  The skin margins were undermined to an appropriate distance in all directions utilizing iris scissors.
Composite Graft Text: The defect edges were debeveled with a #15 scalpel blade.  Given the location of the defect, shape of the defect, the proximity to free margins and the fact the defect was full thickness a composite graft was deemed most appropriate.  The defect was outline and then transferred to the donor site.  A full thickness graft was then excised from the donor site. The graft was then placed in the primary defect, oriented appropriately and then sutured into place.  The secondary defect was then repaired using a primary closure.
Epidermal Sutures: 4-0 PDO
Complex Repair And Epidermal Autograft Text: The defect edges were debeveled with a #15 scalpel blade.  The primary defect was closed partially with a complex linear closure.  Given the location of the defect, shape of the defect and the proximity to free margins an epidermal autograft was deemed most appropriate to repair the remaining defect.  The graft was trimmed to fit the size of the remaining defect.  The graft was then placed in the primary defect, oriented appropriately, and sutured into place.
Modified Advancement Flap Text: The defect edges were debeveled with a #15 scalpel blade.  Given the location of the defect, shape of the defect and the proximity to free margins a modified advancement flap was deemed most appropriate.  Using a sterile surgical marker, an appropriate advancement flap was drawn incorporating the defect and placing the expected incisions within the relaxed skin tension lines where possible.    The area thus outlined was incised deep to adipose tissue with a #15 scalpel blade.  The skin margins were undermined to an appropriate distance in all directions utilizing iris scissors.
Posterior Auricular Interpolation Flap Text: A decision was made to reconstruct the defect utilizing an interpolation axial flap and a staged reconstruction.  A telfa template was made of the defect.  This telfa template was then used to outline the posterior auricular interpolation flap.  The donor area for the pedicle flap was then injected with anesthesia.  The flap was excised through the skin and subcutaneous tissue down to the layer of the underlying musculature.  The pedicle flap was carefully excised within this deep plane to maintain its blood supply.  The edges of the donor site were undermined.   The donor site was closed in a primary fashion.  The pedicle was then rotated into position and sutured.  Once the tube was sutured into place, adequate blood supply was confirmed with blanching and refill.  The pedicle was then wrapped with xeroform gauze and dressed appropriately with a telfa and gauze bandage to ensure continued blood supply and protect the attached pedicle.
Suture Removal: 11 days
Complex Repair And Transposition Flap Text: The defect edges were debeveled with a #15 scalpel blade.  The primary defect was closed partially with a complex linear closure.  Given the location of the remaining defect, shape of the defect and the proximity to free margins a transposition flap was deemed most appropriate for complete closure of the defect.  Using a sterile surgical marker, an appropriate advancement flap was drawn incorporating the defect and placing the expected incisions within the relaxed skin tension lines where possible.    The area thus outlined was incised deep to adipose tissue with a #15 scalpel blade.  The skin margins were undermined to an appropriate distance in all directions utilizing iris scissors.
Burow's Advancement Flap Text: The defect edges were debeveled with a #15 scalpel blade.  Given the location of the defect and the proximity to free margins a Burow's advancement flap was deemed most appropriate.  Using a sterile surgical marker, the appropriate advancement flap was drawn incorporating the defect and placing the expected incisions within the relaxed skin tension lines where possible.    The area thus outlined was incised deep to adipose tissue with a #15 scalpel blade.  The skin margins were undermined to an appropriate distance in all directions utilizing iris scissors.
Helical Rim Text: The closure involved the helical rim.
V-Y Plasty Text: The defect edges were debeveled with a #15 scalpel blade.  Given the location of the defect, shape of the defect and the proximity to free margins an V-Y advancement flap was deemed most appropriate.  Using a sterile surgical marker, an appropriate advancement flap was drawn incorporating the defect and placing the expected incisions within the relaxed skin tension lines where possible.    The area thus outlined was incised deep to adipose tissue with a #15 scalpel blade.  The skin margins were undermined to an appropriate distance in all directions utilizing iris scissors.
Dressing: pressure dressing with telfa
Complex Repair And A-T Advancement Flap Text: The defect edges were debeveled with a #15 scalpel blade.  The primary defect was closed partially with a complex linear closure.  Given the location of the remaining defect, shape of the defect and the proximity to free margins an A-T advancement flap was deemed most appropriate for complete closure of the defect.  Using a sterile surgical marker, an appropriate advancement flap was drawn incorporating the defect and placing the expected incisions within the relaxed skin tension lines where possible.    The area thus outlined was incised deep to adipose tissue with a #15 scalpel blade.  The skin margins were undermined to an appropriate distance in all directions utilizing iris scissors.
Excisional Biopsy Additional Text (Leave Blank If You Do Not Want): The margin was drawn around the clinically apparent lesion. An elliptical shape was then drawn on the skin incorporating the lesion and margins.  Incisions were then made along these lines to the appropriate tissue plane and the lesion was extirpated.
Medical Necessity Clause: This procedure was medically necessary because the lesion that was treated was:
Alar Island Pedicle Flap Text: The defect edges were debeveled with a #15 scalpel blade.  Given the location of the defect, shape of the defect and the proximity to the alar rim an island pedicle advancement flap was deemed most appropriate.  Using a sterile surgical marker, an appropriate advancement flap was drawn incorporating the defect, outlining the appropriate donor tissue and placing the expected incisions within the nasal ala running parallel to the alar rim. The area thus outlined was incised with a #15 scalpel blade.  The skin margins were undermined minimally to an appropriate distance in all directions around the primary defect and laterally outward around the island pedicle utilizing iris scissors.  There was minimal undermining beneath the pedicle flap.
Purse String (Simple) Text: Given the location of the defect and the characteristics of the surrounding skin a purse string simple closure was deemed most appropriate.  Undermining was performed circumferentially around the surgical defect.  A purse string suture was then placed and tightened.
Rhomboid Transposition Flap Text: The defect edges were debeveled with a #15 scalpel blade.  Given the location of the defect and the proximity to free margins a rhomboid transposition flap was deemed most appropriate.  Using a sterile surgical marker, an appropriate rhomboid flap was drawn incorporating the defect.    The area thus outlined was incised deep to adipose tissue with a #15 scalpel blade.  The skin margins were undermined to an appropriate distance in all directions utilizing iris scissors.
Fusiform Excision Additional Text (Leave Blank If You Do Not Want): The margin was drawn around the clinically apparent lesion.  A fusiform shape was then drawn on the skin incorporating the lesion and margins.  Incisions were then made along these lines to the appropriate tissue plane and the lesion was extirpated.
Anesthesia Type: 1% lidocaine with epinephrine
Trilobed Flap Text: The defect edges were debeveled with a #15 scalpel blade.  Given the location of the defect and the proximity to free margins a trilobed flap was deemed most appropriate.  Using a sterile surgical marker, an appropriate trilobed flap drawn around the defect.    The area thus outlined was incised deep to adipose tissue with a #15 scalpel blade.  The skin margins were undermined to an appropriate distance in all directions utilizing iris scissors.
Skin Substitute Text: The defect edges were debeveled with a #15 scalpel blade.  Given the location of the defect, shape of the defect and the proximity to free margins a skin substitute graft was deemed most appropriate.  The graft material was trimmed to fit the size of the defect. The graft was then placed in the primary defect and oriented appropriately.
Complex Repair And Bilobe Flap Text: The defect edges were debeveled with a #15 scalpel blade.  The primary defect was closed partially with a complex linear closure.  Given the location of the remaining defect, shape of the defect and the proximity to free margins a bilobe flap was deemed most appropriate for complete closure of the defect.  Using a sterile surgical marker, an appropriate advancement flap was drawn incorporating the defect and placing the expected incisions within the relaxed skin tension lines where possible.    The area thus outlined was incised deep to adipose tissue with a #15 scalpel blade.  The skin margins were undermined to an appropriate distance in all directions utilizing iris scissors.
Rotation Flap Text: The defect edges were debeveled with a #15 scalpel blade.  Given the location of the defect, shape of the defect and the proximity to free margins a rotation flap was deemed most appropriate.  Using a sterile surgical marker, an appropriate rotation flap was drawn incorporating the defect and placing the expected incisions within the relaxed skin tension lines where possible.    The area thus outlined was incised deep to adipose tissue with a #15 scalpel blade.  The skin margins were undermined to an appropriate distance in all directions utilizing iris scissors.
Scalpel Size: 15 blade
Ftsg Text: The defect edges were debeveled with a #15 scalpel blade.  Given the location of the defect, shape of the defect and the proximity to free margins a full thickness skin graft was deemed most appropriate.  Using a sterile surgical marker, the primary defect shape was transferred to the donor site. The area thus outlined was incised deep to adipose tissue with a #15 scalpel blade.  The harvested graft was then trimmed of adipose tissue until only dermis and epidermis was left.  The skin margins of the secondary defect were undermined to an appropriate distance in all directions utilizing iris scissors.  The secondary defect was closed with interrupted buried subcutaneous sutures.  The skin edges were then re-apposed with running  sutures.  The skin graft was then placed in the primary defect and oriented appropriately.
Complex Repair And Double M Plasty Text: The defect edges were debeveled with a #15 scalpel blade.  The primary defect was closed partially with a complex linear closure.  Given the location of the remaining defect, shape of the defect and the proximity to free margins a double M plasty was deemed most appropriate for complete closure of the defect.  Using a sterile surgical marker, an appropriate advancement flap was drawn incorporating the defect and placing the expected incisions within the relaxed skin tension lines where possible.    The area thus outlined was incised deep to adipose tissue with a #15 scalpel blade.  The skin margins were undermined to an appropriate distance in all directions utilizing iris scissors.
A-T Advancement Flap Text: The defect edges were debeveled with a #15 scalpel blade.  Given the location of the defect, shape of the defect and the proximity to free margins an A-T advancement flap was deemed most appropriate.  Using a sterile surgical marker, an appropriate advancement flap was drawn incorporating the defect and placing the expected incisions within the relaxed skin tension lines where possible.    The area thus outlined was incised deep to adipose tissue with a #15 scalpel blade.  The skin margins were undermined to an appropriate distance in all directions utilizing iris scissors.
Post-Care Instructions: I reviewed with the patient in detail post-care instructions. Patient is not to engage in any heavy lifting, exercise, or swimming for the next 14 days. Should the patient develop any fevers, chills, bleeding, severe pain patient will contact the office immediately.
Z Plasty Text: The lesion was extirpated to the level of the fat with a #15 scalpel blade.  Given the location of the defect, shape of the defect and the proximity to free margins a Z-plasty was deemed most appropriate for repair.  Using a sterile surgical marker, the appropriate transposition arms of the Z-plasty were drawn incorporating the defect and placing the expected incisions within the relaxed skin tension lines where possible.    The area thus outlined was incised deep to adipose tissue with a #15 scalpel blade.  The skin margins were undermined to an appropriate distance in all directions utilizing iris scissors.  The opposing transposition arms were then transposed into place in opposite direction and anchored with interrupted buried subcutaneous sutures.
Repair Performed By Another Provider Text (Leave Blank If You Do Not Want): After the tissue was excised the defect was repaired by another provider.
Keystone Flap Text: The defect edges were debeveled with a #15 scalpel blade.  Given the location of the defect, shape of the defect a keystone flap was deemed most appropriate.  Using a sterile surgical marker, an appropriate keystone flap was drawn incorporating the defect, outlining the appropriate donor tissue and placing the expected incisions within the relaxed skin tension lines where possible. The area thus outlined was incised deep to adipose tissue with a #15 scalpel blade.  The skin margins were undermined to an appropriate distance in all directions around the primary defect and laterally outward around the flap utilizing iris scissors.
Complex Repair And Modified Advancement Flap Text: The defect edges were debeveled with a #15 scalpel blade.  The primary defect was closed partially with a complex linear closure.  Given the location of the remaining defect, shape of the defect and the proximity to free margins a modified advancement flap was deemed most appropriate for complete closure of the defect.  Using a sterile surgical marker, an appropriate advancement flap was drawn incorporating the defect and placing the expected incisions within the relaxed skin tension lines where possible.    The area thus outlined was incised deep to adipose tissue with a #15 scalpel blade.  The skin margins were undermined to an appropriate distance in all directions utilizing iris scissors.
Bilateral Helical Rim Advancement Flap Text: The defect edges were debeveled with a #15 blade scalpel.  Given the location of the defect and the proximity to free margins (helical rim) a bilateral helical rim advancement flap was deemed most appropriate.  Using a sterile surgical marker, the appropriate advancement flaps were drawn incorporating the defect and placing the expected incisions between the helical rim and antihelix where possible.  The area thus outlined was incised through and through with a #15 scalpel blade.  With a skin hook and iris scissors, the flaps were gently and sharply undermined and freed up.
Slit Excision Additional Text (Leave Blank If You Do Not Want): A linear line was drawn on the skin overlying the lesion. An incision was made slowly until the lesion was visualized.  Once visualized, the lesion was removed with blunt dissection.
Debridement Text: The wound edges were debrided prior to proceeding with the closure to facilitate wound healing.
Island Pedicle Flap With Canthal Suspension Text: The defect edges were debeveled with a #15 scalpel blade.  Given the location of the defect, shape of the defect and the proximity to free margins an island pedicle advancement flap was deemed most appropriate.  Using a sterile surgical marker, an appropriate advancement flap was drawn incorporating the defect, outlining the appropriate donor tissue and placing the expected incisions within the relaxed skin tension lines where possible. The area thus outlined was incised deep to adipose tissue with a #15 scalpel blade.  The skin margins were undermined to an appropriate distance in all directions around the primary defect and laterally outward around the island pedicle utilizing iris scissors.  There was minimal undermining beneath the pedicle flap. A suspension suture was placed in the canthal tendon to prevent tension and prevent ectropion.
Purse String (Intermediate) Text: Given the location of the defect and the characteristics of the surrounding skin a purse string intermediate closure was deemed most appropriate.  Undermining was performed circumferentially around the surgical defect.  A purse string suture was then placed and tightened.
Complex Repair And Rhombic Flap Text: The defect edges were debeveled with a #15 scalpel blade.  The primary defect was closed partially with a complex linear closure.  Given the location of the remaining defect, shape of the defect and the proximity to free margins a rhombic flap was deemed most appropriate for complete closure of the defect.  Using a sterile surgical marker, an appropriate advancement flap was drawn incorporating the defect and placing the expected incisions within the relaxed skin tension lines where possible.    The area thus outlined was incised deep to adipose tissue with a #15 scalpel blade.  The skin margins were undermined to an appropriate distance in all directions utilizing iris scissors.
Transposition Flap Text: The defect edges were debeveled with a #15 scalpel blade.  Given the location of the defect and the proximity to free margins a transposition flap was deemed most appropriate.  Using a sterile surgical marker, an appropriate transposition flap was drawn incorporating the defect.    The area thus outlined was incised deep to adipose tissue with a #15 scalpel blade.  The skin margins were undermined to an appropriate distance in all directions utilizing iris scissors.
Suturegard Intro: Intraoperative tissue expansion was performed, utilizing the SUTUREGARD device, in order to reduce wound tension.
Ear Star Wedge Flap Text: The defect edges were debeveled with a #15 blade scalpel.  Given the location of the defect and the proximity to free margins (helical rim) an ear star wedge flap was deemed most appropriate.  Using a sterile surgical marker, the appropriate flap was drawn incorporating the defect and placing the expected incisions between the helical rim and antihelix where possible.  The area thus outlined was incised through and through with a #15 scalpel blade.
Cartilage Graft Text: The defect edges were debeveled with a #15 scalpel blade.  Given the location of the defect, shape of the defect, the fact the defect involved a full thickness cartilage defect a cartilage graft was deemed most appropriate.  An appropriate donor site was identified, cleansed, and anesthetized. The cartilage graft was then harvested and transferred to the recipient site, oriented appropriately and then sutured into place.  The secondary defect was then repaired using a primary closure.
Information: Selecting Yes will display possible errors in your note based on the variables you have selected. This validation is only offered as a suggestion for you. PLEASE NOTE THAT THE VALIDATION TEXT WILL BE REMOVED WHEN YOU FINALIZE YOUR NOTE. IF YOU WANT TO FAX A PRELIMINARY NOTE YOU WILL NEED TO TOGGLE THIS TO 'NO' IF YOU DO NOT WANT IT IN YOUR FAXED NOTE.
Excision Method: Elliptical
O-Z Flap Text: The defect edges were debeveled with a #15 scalpel blade.  Given the location of the defect, shape of the defect and the proximity to free margins an O-Z flap was deemed most appropriate.  Using a sterile surgical marker, an appropriate transposition flap was drawn incorporating the defect and placing the expected incisions within the relaxed skin tension lines where possible. The area thus outlined was incised deep to adipose tissue with a #15 scalpel blade.  The skin margins were undermined to an appropriate distance in all directions utilizing iris scissors.
Cheek-To-Nose Interpolation Flap Text: A decision was made to reconstruct the defect utilizing an interpolation axial flap and a staged reconstruction.  A telfa template was made of the defect.  This telfa template was then used to outline the Cheek-To-Nose Interpolation flap.  The donor area for the pedicle flap was then injected with anesthesia.  The flap was excised through the skin and subcutaneous tissue down to the layer of the underlying musculature.  The interpolation flap was carefully excised within this deep plane to maintain its blood supply.  The edges of the donor site were undermined.   The donor site was closed in a primary fashion.  The pedicle was then rotated into position and sutured.  Once the tube was sutured into place, adequate blood supply was confirmed with blanching and refill.  The pedicle was then wrapped with xeroform gauze and dressed appropriately with a telfa and gauze bandage to ensure continued blood supply and protect the attached pedicle.
Complex Repair And Xenograft Text: The defect edges were debeveled with a #15 scalpel blade.  The primary defect was closed partially with a complex linear closure.  Given the location of the defect, shape of the defect and the proximity to free margins a xenograft was deemed most appropriate to repair the remaining defect.  The graft was trimmed to fit the size of the remaining defect.  The graft was then placed in the primary defect, oriented appropriately, and sutured into place.
Advancement Flap (Double) Text: The defect edges were debeveled with a #15 scalpel blade.  Given the location of the defect and the proximity to free margins a double advancement flap was deemed most appropriate.  Using a sterile surgical marker, the appropriate advancement flaps were drawn incorporating the defect and placing the expected incisions within the relaxed skin tension lines where possible.    The area thus outlined was incised deep to adipose tissue with a #15 scalpel blade.  The skin margins were undermined to an appropriate distance in all directions utilizing iris scissors.
Double O-Z Plasty Text: The defect edges were debeveled with a #15 scalpel blade.  Given the location of the defect, shape of the defect and the proximity to free margins a Double O-Z plasty (double transposition flap) was deemed most appropriate.  Using a sterile surgical marker, the appropriate transposition flaps were drawn incorporating the defect and placing the expected incisions within the relaxed skin tension lines where possible. The area thus outlined was incised deep to adipose tissue with a #15 scalpel blade.  The skin margins were undermined to an appropriate distance in all directions utilizing iris scissors.  Hemostasis was achieved with electrocautery.  The flaps were then transposed into place, one clockwise and the other counterclockwise, and anchored with interrupted buried subcutaneous sutures.
Complex Repair And Dorsal Nasal Flap Text: The defect edges were debeveled with a #15 scalpel blade.  The primary defect was closed partially with a complex linear closure.  Given the location of the remaining defect, shape of the defect and the proximity to free margins a dorsal nasal flap was deemed most appropriate for complete closure of the defect.  Using a sterile surgical marker, an appropriate flap was drawn incorporating the defect and placing the expected incisions within the relaxed skin tension lines where possible.    The area thus outlined was incised deep to adipose tissue with a #15 scalpel blade.  The skin margins were undermined to an appropriate distance in all directions utilizing iris scissors.
Length To Time In Minutes Device Was In Place: 10
Dermal Closure: with plication
Island Pedicle Flap-Requiring Vessel Identification Text: The defect edges were debeveled with a #15 scalpel blade.  Given the location of the defect, shape of the defect and the proximity to free margins an island pedicle advancement flap was deemed most appropriate.  Using a sterile surgical marker, an appropriate advancement flap was drawn, based on the axial vessel mentioned above, incorporating the defect, outlining the appropriate donor tissue and placing the expected incisions within the relaxed skin tension lines where possible.    The area thus outlined was incised deep to adipose tissue with a #15 scalpel blade.  The skin margins were undermined to an appropriate distance in all directions around the primary defect and laterally outward around the island pedicle utilizing iris scissors.  There was minimal undermining beneath the pedicle flap.
Complex Repair And Double Advancement Flap Text: The defect edges were debeveled with a #15 scalpel blade.  The primary defect was closed partially with a complex linear closure.  Given the location of the remaining defect, shape of the defect and the proximity to free margins a double advancement flap was deemed most appropriate for complete closure of the defect.  Using a sterile surgical marker, an appropriate advancement flap was drawn incorporating the defect and placing the expected incisions within the relaxed skin tension lines where possible.    The area thus outlined was incised deep to adipose tissue with a #15 scalpel blade.  The skin margins were undermined to an appropriate distance in all directions utilizing iris scissors.
Complex Repair And M Plasty Text: The defect edges were debeveled with a #15 scalpel blade.  The primary defect was closed partially with a complex linear closure.  Given the location of the remaining defect, shape of the defect and the proximity to free margins an M plasty was deemed most appropriate for complete closure of the defect.  Using a sterile surgical marker, an appropriate advancement flap was drawn incorporating the defect and placing the expected incisions within the relaxed skin tension lines where possible.    The area thus outlined was incised deep to adipose tissue with a #15 scalpel blade.  The skin margins were undermined to an appropriate distance in all directions utilizing iris scissors.
Hemigard Postcare Instructions: The HEMIGARD strips are to remain completely dry for at least 5-7 days.
Rhombic Flap Text: The defect edges were debeveled with a #15 scalpel blade.  Given the location of the defect and the proximity to free margins a rhombic flap was deemed most appropriate.  Using a sterile surgical marker, an appropriate rhombic flap was drawn incorporating the defect.    The area thus outlined was incised deep to adipose tissue with a #15 scalpel blade.  The skin margins were undermined to an appropriate distance in all directions utilizing iris scissors.
Bilobed Transposition Flap Text: The defect edges were debeveled with a #15 scalpel blade.  Given the location of the defect and the proximity to free margins a bilobed transposition flap was deemed most appropriate.  Using a sterile surgical marker, an appropriate bilobe flap drawn around the defect.    The area thus outlined was incised deep to adipose tissue with a #15 scalpel blade.  The skin margins were undermined to an appropriate distance in all directions utilizing iris scissors.
Dermal Autograft Text: The defect edges were debeveled with a #15 scalpel blade.  Given the location of the defect, shape of the defect and the proximity to free margins a dermal autograft was deemed most appropriate.  Using a sterile surgical marker, the primary defect shape was transferred to the donor site. The area thus outlined was incised deep to adipose tissue with a #15 scalpel blade.  The harvested graft was then trimmed of adipose and epidermal tissue until only dermis was left.  The skin graft was then placed in the primary defect and oriented appropriately.
Complex Repair And O-L Flap Text: The defect edges were debeveled with a #15 scalpel blade.  The primary defect was closed partially with a complex linear closure.  Given the location of the remaining defect, shape of the defect and the proximity to free margins an O-L flap was deemed most appropriate for complete closure of the defect.  Using a sterile surgical marker, an appropriate flap was drawn incorporating the defect and placing the expected incisions within the relaxed skin tension lines where possible.    The area thus outlined was incised deep to adipose tissue with a #15 scalpel blade.  The skin margins were undermined to an appropriate distance in all directions utilizing iris scissors.
Anesthesia Type: 1% lidocaine with epinephrine and a 1:10 solution of 8.4% sodium bicarbonate
Mercedes Flap Text: The defect edges were debeveled with a #15 scalpel blade.  Given the location of the defect, shape of the defect and the proximity to free margins a Mercedes flap was deemed most appropriate.  Using a sterile surgical marker, an appropriate advancement flap was drawn incorporating the defect and placing the expected incisions within the relaxed skin tension lines where possible. The area thus outlined was incised deep to adipose tissue with a #15 scalpel blade.  The skin margins were undermined to an appropriate distance in all directions utilizing iris scissors.
Wound Care: Vaseline
Mastoid Interpolation Flap Text: A decision was made to reconstruct the defect utilizing an interpolation axial flap and a staged reconstruction.  A telfa template was made of the defect.  This telfa template was then used to outline the mastoid interpolation flap.  The donor area for the pedicle flap was then injected with anesthesia.  The flap was excised through the skin and subcutaneous tissue down to the layer of the underlying musculature.  The pedicle flap was carefully excised within this deep plane to maintain its blood supply.  The edges of the donor site were undermined.   The donor site was closed in a primary fashion.  The pedicle was then rotated into position and sutured.  Once the tube was sutured into place, adequate blood supply was confirmed with blanching and refill.  The pedicle was then wrapped with xeroform gauze and dressed appropriately with a telfa and gauze bandage to ensure continued blood supply and protect the attached pedicle.
Number Of Hemigard Strips Per Side: 1
Nostril Rim Text: The closure involved the nostril rim.
Crescentic Advancement Flap Text: The defect edges were debeveled with a #15 scalpel blade.  Given the location of the defect and the proximity to free margins a crescentic advancement flap was deemed most appropriate.  Using a sterile surgical marker, the appropriate advancement flap was drawn incorporating the defect and placing the expected incisions within the relaxed skin tension lines where possible.    The area thus outlined was incised deep to adipose tissue with a #15 scalpel blade.  The skin margins were undermined to an appropriate distance in all directions utilizing iris scissors.
W Plasty Text: The lesion was extirpated to the level of the fat with a #15 scalpel blade.  Given the location of the defect, shape of the defect and the proximity to free margins a W-plasty was deemed most appropriate for repair.  Using a sterile surgical marker, the appropriate transposition arms of the W-plasty were drawn incorporating the defect and placing the expected incisions within the relaxed skin tension lines where possible.    The area thus outlined was incised deep to adipose tissue with a #15 scalpel blade.  The skin margins were undermined to an appropriate distance in all directions utilizing iris scissors.  The opposing transposition arms were then transposed into place in opposite direction and anchored with interrupted buried subcutaneous sutures.
Complex Repair And Split-Thickness Skin Graft Text: The defect edges were debeveled with a #15 scalpel blade.  The primary defect was closed partially with a complex linear closure.  Given the location of the defect, shape of the defect and the proximity to free margins a split thickness skin graft was deemed most appropriate to repair the remaining defect.  The graft was trimmed to fit the size of the remaining defect.  The graft was then placed in the primary defect, oriented appropriately, and sutured into place.

## 2020-12-11 ENCOUNTER — APPOINTMENT (RX ONLY)
Dept: URBAN - METROPOLITAN AREA CLINIC 24 | Facility: CLINIC | Age: 85
Setting detail: DERMATOLOGY
End: 2020-12-11

## 2020-12-11 DIAGNOSIS — Z48.01 ENCOUNTER FOR CHANGE OR REMOVAL OF SURGICAL WOUND DRESSING: ICD-10-CM

## 2020-12-11 PROCEDURE — 99024 POSTOP FOLLOW-UP VISIT: CPT

## 2020-12-11 PROCEDURE — ? SUTURE REMOVAL (GLOBAL PERIOD)

## 2020-12-11 ASSESSMENT — LOCATION SIMPLE DESCRIPTION DERM: LOCATION SIMPLE: RIGHT THIGH

## 2020-12-11 ASSESSMENT — LOCATION DETAILED DESCRIPTION DERM: LOCATION DETAILED: RIGHT ANTERIOR PROXIMAL THIGH

## 2020-12-11 ASSESSMENT — LOCATION ZONE DERM: LOCATION ZONE: LEG

## 2020-12-11 NOTE — PROCEDURE: SUTURE REMOVAL (GLOBAL PERIOD)
Add 49519 Cpt? (Important Note: In 2017 The Use Of 18144 Is Being Tracked By Cms To Determine Future Global Period Reimbursement For Global Periods): no
Detail Level: Detailed

## 2021-02-24 ENCOUNTER — TRANSCRIBE ORDER (OUTPATIENT)
Dept: SCHEDULING | Age: 86
End: 2021-02-24

## 2021-02-24 DIAGNOSIS — Z12.31 VISIT FOR SCREENING MAMMOGRAM: Primary | ICD-10-CM

## 2021-03-11 ENCOUNTER — HOSPITAL ENCOUNTER (OUTPATIENT)
Dept: MAMMOGRAPHY | Age: 86
Discharge: HOME | End: 2021-03-11
Attending: FAMILY MEDICINE
Payer: MEDICARE

## 2021-03-11 DIAGNOSIS — Z12.31 VISIT FOR SCREENING MAMMOGRAM: ICD-10-CM

## 2021-03-11 PROCEDURE — 77063 BREAST TOMOSYNTHESIS BI: CPT

## 2021-06-14 ENCOUNTER — APPOINTMENT (RX ONLY)
Dept: URBAN - METROPOLITAN AREA CLINIC 24 | Facility: CLINIC | Age: 86
Setting detail: DERMATOLOGY
End: 2021-06-14

## 2021-06-14 DIAGNOSIS — Z71.89 OTHER SPECIFIED COUNSELING: ICD-10-CM

## 2021-06-14 DIAGNOSIS — L57.8 OTHER SKIN CHANGES DUE TO CHRONIC EXPOSURE TO NONIONIZING RADIATION: ICD-10-CM

## 2021-06-14 DIAGNOSIS — Z87.2 PERSONAL HISTORY OF DISEASES OF THE SKIN AND SUBCUTANEOUS TISSUE: ICD-10-CM

## 2021-06-14 DIAGNOSIS — Z85.828 PERSONAL HISTORY OF OTHER MALIGNANT NEOPLASM OF SKIN: ICD-10-CM

## 2021-06-14 PROCEDURE — 99213 OFFICE O/P EST LOW 20 MIN: CPT

## 2021-06-14 PROCEDURE — ? COUNSELING

## 2021-06-14 ASSESSMENT — LOCATION SIMPLE DESCRIPTION DERM
LOCATION SIMPLE: RIGHT THIGH
LOCATION SIMPLE: RIGHT POSTERIOR UPPER ARM
LOCATION SIMPLE: NOSE
LOCATION SIMPLE: CHEST
LOCATION SIMPLE: LEFT FOREARM
LOCATION SIMPLE: LEFT CHEEK
LOCATION SIMPLE: RIGHT FOREARM

## 2021-06-14 ASSESSMENT — LOCATION DETAILED DESCRIPTION DERM
LOCATION DETAILED: NASAL DORSUM
LOCATION DETAILED: RIGHT ANTERIOR PROXIMAL THIGH
LOCATION DETAILED: RIGHT PROXIMAL POSTERIOR UPPER ARM
LOCATION DETAILED: LEFT PROXIMAL DORSAL FOREARM
LOCATION DETAILED: LEFT MEDIAL MALAR CHEEK
LOCATION DETAILED: MIDDLE STERNUM
LOCATION DETAILED: RIGHT PROXIMAL DORSAL FOREARM

## 2021-06-14 ASSESSMENT — LOCATION ZONE DERM
LOCATION ZONE: LEG
LOCATION ZONE: FACE
LOCATION ZONE: NOSE
LOCATION ZONE: TRUNK
LOCATION ZONE: ARM

## 2021-06-14 NOTE — PROCEDURE: COUNSELING
Detail Level: Detailed
Sunscreen Recommendations: 50 SPF+, sun protective clothing
Detail Level: Generalized

## 2021-11-08 ENCOUNTER — APPOINTMENT (RX ONLY)
Dept: URBAN - METROPOLITAN AREA CLINIC 24 | Facility: CLINIC | Age: 86
Setting detail: DERMATOLOGY
End: 2021-11-08

## 2021-11-08 DIAGNOSIS — L57.8 OTHER SKIN CHANGES DUE TO CHRONIC EXPOSURE TO NONIONIZING RADIATION: ICD-10-CM

## 2021-11-08 DIAGNOSIS — D18.0 HEMANGIOMA: ICD-10-CM

## 2021-11-08 DIAGNOSIS — Z87.2 PERSONAL HISTORY OF DISEASES OF THE SKIN AND SUBCUTANEOUS TISSUE: ICD-10-CM

## 2021-11-08 DIAGNOSIS — Z85.828 PERSONAL HISTORY OF OTHER MALIGNANT NEOPLASM OF SKIN: ICD-10-CM

## 2021-11-08 DIAGNOSIS — L82.1 OTHER SEBORRHEIC KERATOSIS: ICD-10-CM

## 2021-11-08 DIAGNOSIS — Z71.89 OTHER SPECIFIED COUNSELING: ICD-10-CM

## 2021-11-08 PROBLEM — D18.01 HEMANGIOMA OF SKIN AND SUBCUTANEOUS TISSUE: Status: ACTIVE | Noted: 2021-11-08

## 2021-11-08 PROCEDURE — ? COUNSELING

## 2021-11-08 PROCEDURE — 99213 OFFICE O/P EST LOW 20 MIN: CPT

## 2021-11-08 ASSESSMENT — LOCATION DETAILED DESCRIPTION DERM
LOCATION DETAILED: LEFT MEDIAL MALAR CHEEK
LOCATION DETAILED: LEFT SUPERIOR LATERAL MALAR CHEEK
LOCATION DETAILED: MIDDLE STERNUM
LOCATION DETAILED: LEFT LATERAL ABDOMEN
LOCATION DETAILED: RIGHT ANTERIOR PROXIMAL THIGH
LOCATION DETAILED: RIGHT SUPERIOR LATERAL MALAR CHEEK
LOCATION DETAILED: LEFT MID-UPPER BACK
LOCATION DETAILED: RIGHT PROXIMAL DORSAL FOREARM
LOCATION DETAILED: RIGHT DISTAL POSTERIOR THIGH
LOCATION DETAILED: RIGHT PROXIMAL POSTERIOR UPPER ARM
LOCATION DETAILED: LEFT PROXIMAL DORSAL FOREARM
LOCATION DETAILED: RIGHT MEDIAL BREAST 2-3:00 REGION
LOCATION DETAILED: RIGHT POSTERIOR SHOULDER
LOCATION DETAILED: NASAL DORSUM
LOCATION DETAILED: EPIGASTRIC SKIN
LOCATION DETAILED: RIGHT SUPERIOR LATERAL MIDBACK
LOCATION DETAILED: LEFT SUPERIOR UPPER BACK
LOCATION DETAILED: LEFT SUPERIOR MEDIAL MIDBACK

## 2021-11-08 ASSESSMENT — LOCATION SIMPLE DESCRIPTION DERM
LOCATION SIMPLE: LEFT UPPER BACK
LOCATION SIMPLE: RIGHT THIGH
LOCATION SIMPLE: RIGHT SHOULDER
LOCATION SIMPLE: LEFT FOREARM
LOCATION SIMPLE: RIGHT LOWER BACK
LOCATION SIMPLE: CHEST
LOCATION SIMPLE: RIGHT BREAST
LOCATION SIMPLE: RIGHT CHEEK
LOCATION SIMPLE: LEFT CHEEK
LOCATION SIMPLE: RIGHT POSTERIOR UPPER ARM
LOCATION SIMPLE: RIGHT FOREARM
LOCATION SIMPLE: NOSE
LOCATION SIMPLE: ABDOMEN
LOCATION SIMPLE: RIGHT POSTERIOR THIGH
LOCATION SIMPLE: LEFT LOWER BACK

## 2021-11-08 ASSESSMENT — LOCATION ZONE DERM
LOCATION ZONE: TRUNK
LOCATION ZONE: FACE
LOCATION ZONE: LEG
LOCATION ZONE: NOSE
LOCATION ZONE: ARM

## 2022-02-14 ENCOUNTER — TRANSCRIBE ORDER (OUTPATIENT)
Dept: SCHEDULING | Age: 87
End: 2022-02-14

## 2022-02-14 DIAGNOSIS — Z12.31 SCREENING MAMMOGRAM FOR HIGH-RISK PATIENT: Primary | ICD-10-CM

## 2022-03-14 ENCOUNTER — HOSPITAL ENCOUNTER (OUTPATIENT)
Dept: MAMMOGRAPHY | Age: 87
Discharge: HOME | End: 2022-03-14
Attending: FAMILY MEDICINE
Payer: MEDICARE

## 2022-03-14 DIAGNOSIS — Z12.31 SCREENING MAMMOGRAM FOR HIGH-RISK PATIENT: ICD-10-CM

## 2022-03-14 PROCEDURE — 77063 BREAST TOMOSYNTHESIS BI: CPT

## 2022-05-27 ENCOUNTER — HOSPITAL ENCOUNTER (EMERGENCY)
Age: 87
Discharge: HOME | End: 2022-05-27
Attending: EMERGENCY MEDICINE
Payer: MEDICARE

## 2022-05-27 ENCOUNTER — HOSPITAL ENCOUNTER (EMERGENCY)
Dept: GENERAL RADIOLOGY | Age: 87
Discharge: HOME | End: 2022-05-30
Payer: MEDICARE

## 2022-05-27 ENCOUNTER — HOSPITAL ENCOUNTER (EMERGENCY)
Dept: CT IMAGING | Age: 87
Discharge: HOME | End: 2022-05-30
Payer: MEDICARE

## 2022-05-27 VITALS
HEIGHT: 64 IN | OXYGEN SATURATION: 97 % | BODY MASS INDEX: 18.78 KG/M2 | HEART RATE: 76 BPM | TEMPERATURE: 98.5 F | RESPIRATION RATE: 16 BRPM | WEIGHT: 110 LBS | SYSTOLIC BLOOD PRESSURE: 122 MMHG | DIASTOLIC BLOOD PRESSURE: 67 MMHG

## 2022-05-27 DIAGNOSIS — S09.90XA INJURY OF HEAD, INITIAL ENCOUNTER: Primary | ICD-10-CM

## 2022-05-27 DIAGNOSIS — E87.6 HYPOKALEMIA: ICD-10-CM

## 2022-05-27 DIAGNOSIS — S00.93XA CONTUSION OF HEAD, UNSPECIFIED PART OF HEAD, INITIAL ENCOUNTER: ICD-10-CM

## 2022-05-27 LAB
ALBUMIN SERPL-MCNC: 3.6 G/DL (ref 3.2–4.6)
ALBUMIN/GLOB SERPL: 1.1 {RATIO} (ref 1.2–3.5)
ALP SERPL-CCNC: 101 U/L (ref 50–130)
ALT SERPL-CCNC: 37 U/L (ref 12–65)
ANION GAP SERPL CALC-SCNC: 10 MMOL/L (ref 7–16)
APPEARANCE UR: CLEAR
AST SERPL-CCNC: 38 U/L (ref 15–37)
BASOPHILS # BLD: 0 K/UL (ref 0–0.2)
BASOPHILS NFR BLD: 1 % (ref 0–2)
BILIRUB SERPL-MCNC: 0.4 MG/DL (ref 0.2–1.1)
BILIRUB UR QL: NEGATIVE
BUN SERPL-MCNC: 19 MG/DL (ref 8–23)
CALCIUM SERPL-MCNC: 8.9 MG/DL (ref 8.3–10.4)
CHLORIDE SERPL-SCNC: 104 MMOL/L (ref 98–107)
CO2 SERPL-SCNC: 28 MMOL/L (ref 21–32)
COLOR UR: YELLOW
CREAT SERPL-MCNC: 1.04 MG/DL (ref 0.6–1)
DIFFERENTIAL METHOD BLD: NORMAL
EOSINOPHIL # BLD: 0.1 K/UL (ref 0–0.8)
EOSINOPHIL NFR BLD: 2 % (ref 0.5–7.8)
ERYTHROCYTE [DISTWIDTH] IN BLOOD BY AUTOMATED COUNT: 14.2 % (ref 11.9–14.6)
GLOBULIN SER CALC-MCNC: 3.4 G/DL (ref 2.3–3.5)
GLUCOSE SERPL-MCNC: 91 MG/DL (ref 65–100)
GLUCOSE UR STRIP.AUTO-MCNC: NEGATIVE MG/DL
HCT VFR BLD AUTO: 42 % (ref 35.8–46.3)
HGB BLD-MCNC: 13.7 G/DL (ref 11.7–15.4)
HGB UR QL STRIP: NEGATIVE
IMM GRANULOCYTES # BLD AUTO: 0 K/UL (ref 0–0.5)
IMM GRANULOCYTES NFR BLD AUTO: 0 % (ref 0–5)
KETONES UR QL STRIP.AUTO: NEGATIVE MG/DL
LEUKOCYTE ESTERASE UR QL STRIP.AUTO: NEGATIVE
LYMPHOCYTES # BLD: 1.3 K/UL (ref 0.5–4.6)
LYMPHOCYTES NFR BLD: 15 % (ref 13–44)
MCH RBC QN AUTO: 31.6 PG (ref 26.1–32.9)
MCHC RBC AUTO-ENTMCNC: 32.6 G/DL (ref 31.4–35)
MCV RBC AUTO: 96.8 FL (ref 79.6–97.8)
MONOCYTES # BLD: 0.8 K/UL (ref 0.1–1.3)
MONOCYTES NFR BLD: 10 % (ref 4–12)
NEUTS SEG # BLD: 6 K/UL (ref 1.7–8.2)
NEUTS SEG NFR BLD: 73 % (ref 43–78)
NITRITE UR QL STRIP.AUTO: NEGATIVE
NRBC # BLD: 0 K/UL (ref 0–0.2)
PH UR STRIP: 7.5 [PH] (ref 5–9)
PLATELET # BLD AUTO: 188 K/UL (ref 150–450)
PMV BLD AUTO: 10.5 FL (ref 9.4–12.3)
POTASSIUM SERPL-SCNC: 3.1 MMOL/L (ref 3.5–5.1)
PROT SERPL-MCNC: 7 G/DL (ref 6.3–8.2)
PROT UR STRIP-MCNC: NEGATIVE MG/DL
RBC # BLD AUTO: 4.34 M/UL (ref 4.05–5.2)
SODIUM SERPL-SCNC: 142 MMOL/L (ref 136–145)
SP GR UR REFRACTOMETRY: 1 (ref 1–1.02)
UROBILINOGEN UR QL STRIP.AUTO: 1 EU/DL (ref 0.2–1)
WBC # BLD AUTO: 8.2 K/UL (ref 4.3–11.1)

## 2022-05-27 PROCEDURE — 80053 COMPREHEN METABOLIC PANEL: CPT

## 2022-05-27 PROCEDURE — 99284 EMERGENCY DEPT VISIT MOD MDM: CPT

## 2022-05-27 PROCEDURE — 70486 CT MAXILLOFACIAL W/O DYE: CPT

## 2022-05-27 PROCEDURE — 96360 HYDRATION IV INFUSION INIT: CPT

## 2022-05-27 PROCEDURE — 72125 CT NECK SPINE W/O DYE: CPT

## 2022-05-27 PROCEDURE — 85025 COMPLETE CBC W/AUTO DIFF WBC: CPT

## 2022-05-27 PROCEDURE — 2580000003 HC RX 258: Performed by: EMERGENCY MEDICINE

## 2022-05-27 PROCEDURE — 70450 CT HEAD/BRAIN W/O DYE: CPT

## 2022-05-27 PROCEDURE — 73130 X-RAY EXAM OF HAND: CPT

## 2022-05-27 PROCEDURE — 6360000002 HC RX W HCPCS: Performed by: PHYSICIAN ASSISTANT

## 2022-05-27 PROCEDURE — 90714 TD VACC NO PRESV 7 YRS+ IM: CPT | Performed by: PHYSICIAN ASSISTANT

## 2022-05-27 PROCEDURE — 90471 IMMUNIZATION ADMIN: CPT | Performed by: PHYSICIAN ASSISTANT

## 2022-05-27 PROCEDURE — 81003 URINALYSIS AUTO W/O SCOPE: CPT

## 2022-05-27 RX ORDER — CALCIUM CARBONATE 500(1250)
500 TABLET ORAL DAILY
COMMUNITY

## 2022-05-27 RX ORDER — 0.9 % SODIUM CHLORIDE 0.9 %
500 INTRAVENOUS SOLUTION INTRAVENOUS ONCE
Status: COMPLETED | OUTPATIENT
Start: 2022-05-27 | End: 2022-05-27

## 2022-05-27 RX ORDER — TETANUS AND DIPHTHERIA TOXOIDS ADSORBED 2; 2 [LF]/.5ML; [LF]/.5ML
0.5 INJECTION INTRAMUSCULAR
Status: COMPLETED | OUTPATIENT
Start: 2022-05-27 | End: 2022-05-27

## 2022-05-27 RX ORDER — ATORVASTATIN CALCIUM 80 MG/1
80 TABLET, FILM COATED ORAL DAILY
COMMUNITY

## 2022-05-27 RX ORDER — OMEGA-3S/DHA/EPA/FISH OIL/D3 300MG-1000
400 CAPSULE ORAL DAILY
COMMUNITY

## 2022-05-27 RX ORDER — LEVOTHYROXINE SODIUM 0.07 MG/1
75 TABLET ORAL DAILY
COMMUNITY

## 2022-05-27 RX ORDER — AMLODIPINE BESYLATE 5 MG/1
5 TABLET ORAL DAILY
COMMUNITY

## 2022-05-27 RX ORDER — LISINOPRIL 20 MG/1
20 TABLET ORAL DAILY
COMMUNITY

## 2022-05-27 RX ADMIN — TETANUS AND DIPHTHERIA TOXOIDS ADSORBED 0.5 ML: 2; 2 INJECTION INTRAMUSCULAR at 14:48

## 2022-05-27 RX ADMIN — SODIUM CHLORIDE 500 ML: 9 INJECTION, SOLUTION INTRAVENOUS at 14:43

## 2022-05-27 ASSESSMENT — ENCOUNTER SYMPTOMS
COLOR CHANGE: 1
NAUSEA: 0
BACK PAIN: 0
VOMITING: 0
ABDOMINAL PAIN: 0
DIARRHEA: 0

## 2022-05-27 ASSESSMENT — LIFESTYLE VARIABLES
HOW OFTEN DO YOU HAVE A DRINK CONTAINING ALCOHOL: 4 OR MORE TIMES A WEEK
HOW MANY STANDARD DRINKS CONTAINING ALCOHOL DO YOU HAVE ON A TYPICAL DAY: 3 OR 4
HOW OFTEN DO YOU HAVE A DRINK CONTAINING ALCOHOL: 4 OR MORE TIMES A WEEK
HOW MANY STANDARD DRINKS CONTAINING ALCOHOL DO YOU HAVE ON A TYPICAL DAY: 3 OR 4

## 2022-05-27 ASSESSMENT — PAIN - FUNCTIONAL ASSESSMENT: PAIN_FUNCTIONAL_ASSESSMENT: 0-10

## 2022-05-27 ASSESSMENT — PAIN DESCRIPTION - LOCATION: LOCATION: HEAD

## 2022-05-27 ASSESSMENT — PAIN SCALES - GENERAL: PAINLEVEL_OUTOF10: 8

## 2022-05-27 ASSESSMENT — PAIN DESCRIPTION - DESCRIPTORS: DESCRIPTORS: SORE;ACHING

## 2022-05-27 NOTE — ED PROVIDER NOTES
[facility] Emergency Department Provider Note                   PCP:                None Provider               Age: 80 y.o. Sex: female       ICD-10-CM    1. Injury of head, initial encounter  S09.90XA    2. Contusion of head, unspecified part of head, initial encounter  S00.93XA    3. Hypokalemia  E87.6        DISPOSITION         New Prescriptions    No medications on file       Orders Placed This Encounter   Procedures    CT HEAD WO CONTRAST    CT CERVICAL SPINE WO CONTRAST    CT MAXILLOFACIAL WO CONTRAST    XR HAND RIGHT (MIN 3 VIEWS)    CBC with Auto Differential    CMP    Urinalysis        MDM  Number of Diagnoses or Management Options  Contusion of head, unspecified part of head, initial encounter  Hypokalemia  Injury of head, initial encounter  Diagnosis management comments: Patient feeling fine on reevaluation she has normal mental status no distress and looks well will go home with family. The are currently taking medication for hyperkalemia we will have them stop that since she is hypokalemic today. Sandra Robin MD; 5/27/2022 @3:51 PM Voice dictation software was used during the making of this note. This software is not perfect and grammatical and other typographical errors may be present. This note has not been proofread for errors.  ====================================        Amount and/or Complexity of Data Reviewed  Clinical lab tests: ordered and reviewed  Tests in the radiology section of CPT®: ordered and reviewed         Thomas Schneider is a 80 y.o. female who presents to the Emergency Department with chief complaint of    Chief Complaint   Patient presents with   Reyna Thi Fall      71-year-old female who is coming in after having a fall. She was doing some gardening in the heat and fell forward hitting her head and left wrist.  She does not clearly remember how she fell or how she fell.    who heard her scream said it looks like she had just stepped onto the concrete which is a couple inches above the grass and had tripped. Patient is not on any blood thinners. She has no fever or chills denies any dizziness or lightheadedness. Does have some pain to the face and left wrist and extensive bruising. The history is provided by the patient. All other systems reviewed and are negative. Review of Systems   Constitutional: Negative for chills and fever. Gastrointestinal: Negative for abdominal pain, diarrhea, nausea and vomiting. Musculoskeletal: Negative for arthralgias, back pain, myalgias, neck pain and neck stiffness. Skin: Positive for color change and wound. Negative for pallor and rash. Neurological: Negative for weakness and numbness. Past Medical History:   Diagnosis Date    High cholesterol     High potassium     Hypertension     Hypothyroidism         Past Surgical History:   Procedure Laterality Date    APPENDECTOMY      BREAST BIOPSY      HYSTERECTOMY, TOTAL ABDOMINAL          Family History   Problem Relation Age of Onset    Breast Cancer Neg Hx            Social Connections:     Frequency of Communication with Friends and Family: Not on file    Frequency of Social Gatherings with Friends and Family: Not on file    Attends Synagogue Services: Not on file    Active Member of Clubs or Organizations: Not on file    Attends Club or Organization Meetings: Not on file    Marital Status: Not on file        Allergies   Allergen Reactions    Pcn [Penicillins] Hives    Sulfa Antibiotics Other (See Comments)     Per pt causes blister around mouth        Vitals signs and nursing note reviewed. Patient Vitals for the past 4 hrs:   Temp Pulse Resp BP SpO2   05/27/22 1516 -- -- -- -- 97 %   05/27/22 1210 98.5 °F (36.9 °C) 76 16 122/67 95 %          Physical Exam  Vitals and nursing note reviewed. Constitutional:       General: She is not in acute distress. Appearance: Normal appearance. She is not ill-appearing, toxic-appearing or diaphoretic. HENT:      Head: Normocephalic. Comments: Contusion to the forehead and the chin. Mouth/Throat:      Comments: Normal opening closing of the mandible. Eyes:      General: No scleral icterus. Conjunctiva/sclera: Conjunctivae normal.   Cardiovascular:      Rate and Rhythm: Normal rate and regular rhythm. Pulmonary:      Effort: Pulmonary effort is normal. No respiratory distress. Breath sounds: No stridor. No wheezing, rhonchi or rales. Chest:      Chest wall: No tenderness. Abdominal:      General: Abdomen is flat. Palpations: Abdomen is soft. Tenderness: There is no abdominal tenderness. There is no guarding or rebound. Hernia: No hernia is present. Musculoskeletal:      Cervical back: Normal range of motion and neck supple. No tenderness. Comments: I have palpated all long bones and there was no tenderness present. I have ranged all extremity joints as well and there was full range of motion and no pain when ranging the joints. Skin:     Capillary Refill: Capillary refill takes less than 2 seconds. Findings: Bruising present. Comments: Scattered bruising particularly on the left arm. Neurological:      General: No focal deficit present. Mental Status: She is alert and oriented to person, place, and time. Mental status is at baseline. Psychiatric:         Mood and Affect: Mood normal.         Behavior: Behavior normal.          Procedures    Labs Reviewed   COMPREHENSIVE METABOLIC PANEL - Abnormal; Notable for the following components:       Result Value    Potassium 3.1 (*)     CREATININE 1.04 (*)     GFR Non- 53 (*)     AST 38 (*)     Albumin/Globulin Ratio 1.1 (*)     All other components within normal limits   CBC WITH AUTO DIFFERENTIAL   URINALYSIS        CT HEAD WO CONTRAST   Final Result   Chronic appearing white matter change without acute intracranial   abnormality.             CT CERVICAL SPINE WO CONTRAST   Final Result Multilevel cervical spondylosis without acute posttraumatic sequela. CT MAXILLOFACIAL WO CONTRAST   Final Result   No evidence of acute facial bone fracture. XR HAND RIGHT (MIN 3 VIEWS)   Final Result   Chronic appearing changes without acute abnormality. [place] Coma Scale  Eye Opening: Spontaneous  Best Verbal Response: Oriented  Best Motor Response: Obeys commands  [place] Coma Scale Score: 15                     Voice dictation software was used during the making of this note. This software is not perfect and grammatical and other typographical errors may be present. This note has not been completely proofread for errors.       Ann Libman, MD  05/27/22 0079

## 2022-05-27 NOTE — ED TRIAGE NOTES
Pt states she was outside working in her garden when she fell. Pt c/o laceration to forehead, headache, abrasion to right third knuckle and left hand pain and jaw pain. Spouse states she did have loc. Pt denies being on blood thinner. Pt unsure of last tetanus shot.

## 2022-11-09 NOTE — PROGRESS NOTES
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 Pain is not too bad now. Pain: Initial: Pain Intensity 1: 2(\"1 or 2\")   Post Session:  No change in pain reported by patient. Medications Last Reviewed:  19    Updated Objective Findings:   None Today     TREATMENT:     Manual Therapy ( 25 minutes) - for motion - grade 2 to 4- physio mobs R shoulder flex, abduct, IR and ER. Grade 2 to 4- inferior and posterior shoulder glides. Therapeutic Exercise: (15 Minutes):  Exercises per grid below to improve mobility and strength. Required moderate visual and verbal cues to ensure correct performance.   no major changes to exercises today,     Date  10/22/19 Date  10/24/19 Date  10/28/19 Date  10/29/19 Date  19 Date  19 Date     Activity/Exercise          5 way shoulder isometrics Manual 2x10 ea - Manual 1x10 ea Manual 1x10 ea Manual 2x10 ea - Manual 2x10 ea   IR with band Yellow 2x10 Yellow 2x10 Yellow 2x10 Yellow 2x10 Yellow 2x10 - Yellow 1x10   ER with band Yellow 2x10 Yellow 2x10 Yellow 2x10 What Type Of Note Output Would You Prefer (Optional)?: Standard Output Hpi Title: Evaluation of Skin Lesions Yellow 2x10 Yellow 2x10 - Yellow 1x10   B serratus punch 1# 2x10 1# 2x15 1# 1x10  0# 1x15 1# 2x10 1# 2x12 - 1# 2x12   Flex in supine 2x10 4x5 4x5 1x10  2x5 1# 3x5 - 1# 2x5   Side lying abduct 2x10 4x5 4x5 - 1# 2x15 - 1# 2x10   Side lying ER 2x10 2x10 2x10 - 1# 2x15 - 1# 2x10   scap retract with band Yellow 2x10 Red 2x10 Red 2x10 Red 2x10 Red 2x10 Red 2x10 -   B Serratus punch with band Yellow 2x10 Yellow 2x10 Yellow 2x10 Yellow 2x10 Yellow 2x10 - -   B biceps curls  1# 2x10 1# 2x10 1# 2x12 2# 2x10 - -   Wall push ups  4x5 4x5 - - 2x10 -   Wall slides  1x5 - - - 1x10 -   B shldr extn with band      Red 2x10 -   Lat pull downs      3# 2x10 -       HEP: continue current HEP    [facility] Portal    Treatment/Session Summary:    · Response to Treatment: patient continues to be weak in R shoulder with intermittent pain. She continues to want to avoid surgery. · Communication/Consultation:  None today  · Equipment provided today:  None today  · Recommendations/Intent for next treatment session: Next visit will focus on ROM and strength. Treatment Plan of Care Effective Dates:  10/7/2019 TO 1/5/2020 (90 days).   Frequency/Duration: 2-3 times a week for 90 Day(s)      Total Treatment Billable Duration:  40 minutes  PT Patient Time In/Time Out  Time In: 1120  Time Out: [address] How Severe Are Your Spot(S)?: mild Have Your Spot(S) Been Treated In The Past?: has not been treated

## 2023-02-16 ENCOUNTER — TRANSCRIBE ORDERS (OUTPATIENT)
Dept: SCHEDULING | Age: 88
End: 2023-02-16

## 2023-02-16 DIAGNOSIS — Z12.31 VISIT FOR SCREENING MAMMOGRAM: Primary | ICD-10-CM

## 2023-03-22 ENCOUNTER — HOSPITAL ENCOUNTER (OUTPATIENT)
Dept: MAMMOGRAPHY | Age: 88
Discharge: HOME | End: 2023-03-25
Payer: MEDICARE

## 2023-03-22 DIAGNOSIS — Z12.31 VISIT FOR SCREENING MAMMOGRAM: ICD-10-CM

## 2023-03-22 PROCEDURE — 77063 BREAST TOMOSYNTHESIS BI: CPT

## 2023-09-11 ENCOUNTER — APPOINTMENT (RX ONLY)
Dept: URBAN - METROPOLITAN AREA CLINIC 24 | Facility: CLINIC | Age: 88
Setting detail: DERMATOLOGY
End: 2023-09-11

## 2023-09-11 DIAGNOSIS — L57.8 OTHER SKIN CHANGES DUE TO CHRONIC EXPOSURE TO NONIONIZING RADIATION: ICD-10-CM

## 2023-09-11 DIAGNOSIS — L82.1 OTHER SEBORRHEIC KERATOSIS: ICD-10-CM

## 2023-09-11 DIAGNOSIS — L57.0 ACTINIC KERATOSIS: ICD-10-CM

## 2023-09-11 DIAGNOSIS — Z85.828 PERSONAL HISTORY OF OTHER MALIGNANT NEOPLASM OF SKIN: ICD-10-CM

## 2023-09-11 DIAGNOSIS — D18.0 HEMANGIOMA: ICD-10-CM

## 2023-09-11 DIAGNOSIS — Z87.2 PERSONAL HISTORY OF DISEASES OF THE SKIN AND SUBCUTANEOUS TISSUE: ICD-10-CM

## 2023-09-11 PROBLEM — D18.01 HEMANGIOMA OF SKIN AND SUBCUTANEOUS TISSUE: Status: ACTIVE | Noted: 2023-09-11

## 2023-09-11 PROCEDURE — 99213 OFFICE O/P EST LOW 20 MIN: CPT | Mod: 25

## 2023-09-11 PROCEDURE — ? LIQUID NITROGEN

## 2023-09-11 PROCEDURE — ? COUNSELING

## 2023-09-11 PROCEDURE — 17000 DESTRUCT PREMALG LESION: CPT

## 2023-09-11 ASSESSMENT — LOCATION DETAILED DESCRIPTION DERM
LOCATION DETAILED: EPIGASTRIC SKIN
LOCATION DETAILED: LEFT SUPERIOR MEDIAL MIDBACK
LOCATION DETAILED: LEFT MEDIAL MALAR CHEEK
LOCATION DETAILED: LEFT LATERAL SUPERIOR CHEST
LOCATION DETAILED: LEFT SUPERIOR LATERAL MIDBACK
LOCATION DETAILED: NASAL DORSUM
LOCATION DETAILED: RIGHT LATERAL TRAPEZIAL NECK
LOCATION DETAILED: RIGHT ANTERIOR PROXIMAL THIGH
LOCATION DETAILED: RIGHT LATERAL SUPERIOR CHEST
LOCATION DETAILED: LEFT MEDIAL UPPER BACK
LOCATION DETAILED: LEFT VENTRAL LATERAL DISTAL FOREARM
LOCATION DETAILED: LEFT PROXIMAL RADIAL DORSAL FOREARM
LOCATION DETAILED: RIGHT MEDIAL BREAST 2-3:00 REGION

## 2023-09-11 ASSESSMENT — LOCATION SIMPLE DESCRIPTION DERM
LOCATION SIMPLE: LEFT UPPER BACK
LOCATION SIMPLE: RIGHT THIGH
LOCATION SIMPLE: ABDOMEN
LOCATION SIMPLE: NOSE
LOCATION SIMPLE: LEFT CHEEK
LOCATION SIMPLE: POSTERIOR NECK
LOCATION SIMPLE: CHEST
LOCATION SIMPLE: LEFT LOWER BACK
LOCATION SIMPLE: LEFT FOREARM
LOCATION SIMPLE: RIGHT BREAST

## 2023-09-11 ASSESSMENT — LOCATION ZONE DERM
LOCATION ZONE: NOSE
LOCATION ZONE: FACE
LOCATION ZONE: TRUNK
LOCATION ZONE: ARM
LOCATION ZONE: LEG
LOCATION ZONE: NECK

## 2023-09-11 NOTE — PROCEDURE: LIQUID NITROGEN
Render Note In Bullet Format When Appropriate: No
Post-Care Instructions: I reviewed with the patient in detail post-care instructions. Patient is to wear sunprotection, and avoid picking at any of the treated lesions. Pt may apply Vaseline to crusted or scabbing areas.
Number Of Freeze-Thaw Cycles: 1 freeze-thaw cycle
Detail Level: Detailed
Show Aperture Variable?: Yes
Consent: The patient's consent was obtained including but not limited to risks of crusting, scabbing, blistering, scarring, darker or lighter pigmentary change, recurrence, incomplete removal and infection.
Duration Of Freeze Thaw-Cycle (Seconds): 4

## 2024-02-27 ENCOUNTER — TRANSCRIBE ORDERS (OUTPATIENT)
Dept: SCHEDULING | Age: 89
End: 2024-02-27

## 2024-02-27 DIAGNOSIS — Z12.31 SCREENING MAMMOGRAM FOR HIGH-RISK PATIENT: Primary | ICD-10-CM

## 2024-04-05 ENCOUNTER — HOSPITAL ENCOUNTER (OUTPATIENT)
Dept: MAMMOGRAPHY | Age: 89
End: 2024-04-05
Attending: FAMILY MEDICINE
Payer: MEDICARE

## 2024-04-05 VITALS — WEIGHT: 110 LBS | BODY MASS INDEX: 18.88 KG/M2

## 2024-04-05 DIAGNOSIS — Z12.31 SCREENING MAMMOGRAM FOR HIGH-RISK PATIENT: ICD-10-CM

## 2024-04-05 PROCEDURE — 77067 SCR MAMMO BI INCL CAD: CPT

## 2024-05-06 ENCOUNTER — APPOINTMENT (RX ONLY)
Dept: URBAN - METROPOLITAN AREA CLINIC 24 | Facility: CLINIC | Age: 89
Setting detail: DERMATOLOGY
End: 2024-05-06

## 2024-05-06 DIAGNOSIS — L57.0 ACTINIC KERATOSIS: ICD-10-CM

## 2024-05-06 DIAGNOSIS — L82.0 INFLAMED SEBORRHEIC KERATOSIS: ICD-10-CM

## 2024-05-06 PROCEDURE — 17000 DESTRUCT PREMALG LESION: CPT | Mod: 59

## 2024-05-06 PROCEDURE — 17110 DESTRUCTION B9 LES UP TO 14: CPT

## 2024-05-06 PROCEDURE — ? LIQUID NITROGEN

## 2024-05-06 ASSESSMENT — LOCATION ZONE DERM
LOCATION ZONE: FACE
LOCATION ZONE: NECK

## 2024-05-06 ASSESSMENT — LOCATION DETAILED DESCRIPTION DERM
LOCATION DETAILED: RIGHT INFERIOR FOREHEAD
LOCATION DETAILED: LEFT INFERIOR LATERAL NECK
LOCATION DETAILED: RIGHT FOREHEAD

## 2024-05-06 ASSESSMENT — LOCATION SIMPLE DESCRIPTION DERM
LOCATION SIMPLE: LEFT ANTERIOR NECK
LOCATION SIMPLE: RIGHT FOREHEAD

## 2024-05-06 NOTE — PROCEDURE: LIQUID NITROGEN
Render Post-Care Instructions In Note?: no
Detail Level: Detailed
Show Applicator Variable?: Yes
Post-Care Instructions: I reviewed with the patient in detail post-care instructions. Patient is to wear sunprotection, and avoid picking at any of the treated lesions. Pt may apply Vaseline to crusted or scabbing areas.
Consent: The patient's consent was obtained including but not limited to risks of crusting, scabbing, blistering, scarring, darker or lighter pigmentary change, recurrence, incomplete removal and infection.
Spray Paint Text: The liquid nitrogen was applied to the skin utilizing a spray paint frosting technique.
Medical Necessity Information: It is in your best interest to select a reason for this procedure from the list below. All of these items fulfill various CMS LCD requirements except the new and changing color options.
Medical Necessity Clause: This procedure was medically necessary because the lesions that were treated were:
Number Of Freeze-Thaw Cycles: 1 freeze-thaw cycle
Duration Of Freeze Thaw-Cycle (Seconds): 5

## 2024-09-16 ENCOUNTER — APPOINTMENT (RX ONLY)
Dept: URBAN - METROPOLITAN AREA CLINIC 24 | Facility: CLINIC | Age: 89
Setting detail: DERMATOLOGY
End: 2024-09-16

## 2024-09-16 DIAGNOSIS — L82.1 OTHER SEBORRHEIC KERATOSIS: ICD-10-CM

## 2024-09-16 DIAGNOSIS — Z71.89 OTHER SPECIFIED COUNSELING: ICD-10-CM

## 2024-09-16 DIAGNOSIS — D22 MELANOCYTIC NEVI: ICD-10-CM

## 2024-09-16 DIAGNOSIS — L57.8 OTHER SKIN CHANGES DUE TO CHRONIC EXPOSURE TO NONIONIZING RADIATION: ICD-10-CM

## 2024-09-16 PROBLEM — D22.5 MELANOCYTIC NEVI OF TRUNK: Status: ACTIVE | Noted: 2024-09-16

## 2024-09-16 PROCEDURE — ? COUNSELING

## 2024-09-16 PROCEDURE — 99213 OFFICE O/P EST LOW 20 MIN: CPT

## 2024-09-16 ASSESSMENT — LOCATION DETAILED DESCRIPTION DERM
LOCATION DETAILED: LEFT SUPERIOR UPPER BACK
LOCATION DETAILED: RIGHT INFERIOR MEDIAL UPPER BACK
LOCATION DETAILED: LEFT INFERIOR MEDIAL UPPER BACK
LOCATION DETAILED: RIGHT INFERIOR LATERAL UPPER BACK

## 2024-09-16 ASSESSMENT — LOCATION SIMPLE DESCRIPTION DERM
LOCATION SIMPLE: LEFT UPPER BACK
LOCATION SIMPLE: RIGHT UPPER BACK

## 2024-09-16 ASSESSMENT — LOCATION ZONE DERM: LOCATION ZONE: TRUNK

## 2025-05-09 ENCOUNTER — TRANSCRIBE ORDERS (OUTPATIENT)
Facility: HOSPITAL | Age: 89
End: 2025-05-09

## 2025-05-09 DIAGNOSIS — Z12.31 ENCOUNTER FOR SCREENING MAMMOGRAM FOR MALIGNANT NEOPLASM OF BREAST: Primary | ICD-10-CM

## 2025-05-16 ENCOUNTER — HOSPITAL ENCOUNTER (OUTPATIENT)
Dept: MAMMOGRAPHY | Age: 89
Discharge: HOME | End: 2025-05-19
Attending: FAMILY MEDICINE
Payer: MEDICARE

## 2025-05-16 VITALS — BODY MASS INDEX: 17.07 KG/M2 | HEIGHT: 64 IN | WEIGHT: 100 LBS

## 2025-05-16 DIAGNOSIS — Z12.31 ENCOUNTER FOR SCREENING MAMMOGRAM FOR MALIGNANT NEOPLASM OF BREAST: ICD-10-CM

## 2025-05-16 PROCEDURE — 77063 BREAST TOMOSYNTHESIS BI: CPT

## 2025-05-21 ENCOUNTER — APPOINTMENT (OUTPATIENT)
Dept: URBAN - METROPOLITAN AREA CLINIC 24 | Facility: CLINIC | Age: OVER 89
Setting detail: DERMATOLOGY
End: 2025-05-21

## 2025-05-21 DIAGNOSIS — D485 NEOPLASM OF UNCERTAIN BEHAVIOR OF SKIN: ICD-10-CM

## 2025-05-21 DIAGNOSIS — L70.8 OTHER ACNE: ICD-10-CM

## 2025-05-21 PROBLEM — D48.5 NEOPLASM OF UNCERTAIN BEHAVIOR OF SKIN: Status: ACTIVE | Noted: 2025-05-21

## 2025-05-21 PROCEDURE — ? OTHER

## 2025-05-21 PROCEDURE — ? COUNSELING

## 2025-05-21 PROCEDURE — 99212 OFFICE O/P EST SF 10 MIN: CPT

## 2025-05-21 ASSESSMENT — LOCATION DETAILED DESCRIPTION DERM
LOCATION DETAILED: LEFT CENTRAL EYEBROW
LOCATION DETAILED: LEFT NASAL DORSUM

## 2025-05-21 ASSESSMENT — LOCATION ZONE DERM
LOCATION ZONE: NOSE
LOCATION ZONE: FACE

## 2025-05-21 ASSESSMENT — LOCATION SIMPLE DESCRIPTION DERM
LOCATION SIMPLE: NOSE
LOCATION SIMPLE: LEFT EYEBROW

## 2025-05-21 NOTE — PROCEDURE: OTHER
Other (Free Text): Pt to call office if spot does not resolve or becomes symptomatic.
Note Text (......Xxx Chief Complaint.): This diagnosis correlates with the
Detail Level: Zone
Render Risk Assessment In Note?: no
Other (Free Text): Photo taken for comparison. Will recheck at next appt in September